# Patient Record
Sex: MALE | NOT HISPANIC OR LATINO | Employment: FULL TIME | URBAN - METROPOLITAN AREA
[De-identification: names, ages, dates, MRNs, and addresses within clinical notes are randomized per-mention and may not be internally consistent; named-entity substitution may affect disease eponyms.]

---

## 2023-03-14 LAB
CALCIDIOL (25 OH VITAMIN D3) (NG/ML) IN SER/PLAS: 54 NG/ML
COBALAMIN (VITAMIN B12) (PG/ML) IN SER/PLAS: 1125 PG/ML (ref 211–911)

## 2023-03-20 LAB — VITAMIN B6: 319.7 NMOL/L (ref 20–125)

## 2023-04-20 ASSESSMENT — ENCOUNTER SYMPTOMS
SEIZURES: 0
POLYPHAGIA: 0
TREMORS: 0
FATIGUE: 1
DIZZINESS: 0
WEIGHT LOSS: 0
BLACKOUTS: 0
VISUAL CHANGE: 0
HUNGER: 1
POLYDIPSIA: 0
HEADACHES: 1
NERVOUS/ANXIOUS: 1
WEAKNESS: 0
SPEECH DIFFICULTY: 0
CONFUSION: 1
SWEATS: 0
BLURRED VISION: 0

## 2023-04-24 ENCOUNTER — OFFICE VISIT (OUTPATIENT)
Dept: PRIMARY CARE | Facility: CLINIC | Age: 38
End: 2023-04-24
Payer: COMMERCIAL

## 2023-04-24 VITALS
HEIGHT: 70 IN | SYSTOLIC BLOOD PRESSURE: 125 MMHG | HEART RATE: 94 BPM | TEMPERATURE: 97.1 F | BODY MASS INDEX: 36.22 KG/M2 | OXYGEN SATURATION: 96 % | WEIGHT: 253 LBS | DIASTOLIC BLOOD PRESSURE: 76 MMHG

## 2023-04-24 DIAGNOSIS — F41.8 DEPRESSION WITH ANXIETY: Primary | ICD-10-CM

## 2023-04-24 DIAGNOSIS — E78.2 COMBINED HYPERLIPIDEMIA: ICD-10-CM

## 2023-04-24 DIAGNOSIS — E11.9 TYPE 2 DIABETES MELLITUS WITHOUT COMPLICATION, WITHOUT LONG-TERM CURRENT USE OF INSULIN (MULTI): ICD-10-CM

## 2023-04-24 PROBLEM — G93.32 COVID-19 LONG HAULER MANIFESTING CHRONIC FATIGUE: Status: RESOLVED | Noted: 2023-04-24 | Resolved: 2023-04-24

## 2023-04-24 PROBLEM — Z15.01 BRCA GENE MUTATION POSITIVE IN MALE: Status: RESOLVED | Noted: 2023-04-24 | Resolved: 2023-04-24

## 2023-04-24 PROBLEM — U09.9 COVID-19 LONG HAULER MANIFESTING CHRONIC FATIGUE: Status: RESOLVED | Noted: 2023-04-24 | Resolved: 2023-04-24

## 2023-04-24 PROBLEM — E11.29 TYPE 2 DIABETES MELLITUS WITH MICROALBUMINURIA (MULTI): Status: ACTIVE | Noted: 2023-04-24

## 2023-04-24 PROBLEM — E11.65 TYPE 2 DIABETES MELLITUS WITH HYPERGLYCEMIA, WITHOUT LONG-TERM CURRENT USE OF INSULIN (MULTI): Status: RESOLVED | Noted: 2023-04-24 | Resolved: 2023-04-24

## 2023-04-24 PROBLEM — D50.9 MICROCYTIC ANEMIA: Status: RESOLVED | Noted: 2023-04-24 | Resolved: 2023-04-24

## 2023-04-24 PROBLEM — F52.4 PREMATURE EJACULATION: Status: RESOLVED | Noted: 2023-04-24 | Resolved: 2023-04-24

## 2023-04-24 PROBLEM — Q76.0 SPINA BIFIDA OCCULTA: Status: RESOLVED | Noted: 2023-04-24 | Resolved: 2023-04-24

## 2023-04-24 PROBLEM — G47.33 SEVERE OBSTRUCTIVE SLEEP APNEA: Status: ACTIVE | Noted: 2023-04-24

## 2023-04-24 PROBLEM — Q63.2 ECTOPIC KIDNEY: Status: RESOLVED | Noted: 2023-04-24 | Resolved: 2023-04-24

## 2023-04-24 PROBLEM — Z15.03 BRCA GENE MUTATION POSITIVE IN MALE: Status: RESOLVED | Noted: 2023-04-24 | Resolved: 2023-04-24

## 2023-04-24 PROBLEM — R80.9 TYPE 2 DIABETES MELLITUS WITH MICROALBUMINURIA (MULTI): Status: ACTIVE | Noted: 2023-04-24

## 2023-04-24 PROBLEM — Z15.09 BRCA GENE MUTATION POSITIVE IN MALE: Status: RESOLVED | Noted: 2023-04-24 | Resolved: 2023-04-24

## 2023-04-24 LAB — HBA1C MFR BLD: 6.6 % (ref 4.2–6.5)

## 2023-04-24 PROCEDURE — 80053 COMPREHEN METABOLIC PANEL: CPT

## 2023-04-24 PROCEDURE — 3078F DIAST BP <80 MM HG: CPT | Performed by: INTERNAL MEDICINE

## 2023-04-24 PROCEDURE — 3044F HG A1C LEVEL LT 7.0%: CPT | Performed by: INTERNAL MEDICINE

## 2023-04-24 PROCEDURE — 80061 LIPID PANEL: CPT

## 2023-04-24 PROCEDURE — 83036 HEMOGLOBIN GLYCOSYLATED A1C: CPT | Performed by: INTERNAL MEDICINE

## 2023-04-24 PROCEDURE — 1036F TOBACCO NON-USER: CPT | Performed by: INTERNAL MEDICINE

## 2023-04-24 PROCEDURE — 36415 COLL VENOUS BLD VENIPUNCTURE: CPT | Performed by: INTERNAL MEDICINE

## 2023-04-24 PROCEDURE — 99215 OFFICE O/P EST HI 40 MIN: CPT | Performed by: INTERNAL MEDICINE

## 2023-04-24 PROCEDURE — 3074F SYST BP LT 130 MM HG: CPT | Performed by: INTERNAL MEDICINE

## 2023-04-24 RX ORDER — FLUTICASONE PROPIONATE 50 MCG
SPRAY, SUSPENSION (ML) NASAL
COMMUNITY
Start: 2022-10-07 | End: 2023-09-05 | Stop reason: ALTCHOICE

## 2023-04-24 RX ORDER — TIRZEPATIDE 5 MG/.5ML
5 INJECTION, SOLUTION SUBCUTANEOUS
Qty: 4 ML | Refills: 11 | Status: SHIPPED | OUTPATIENT
Start: 2023-04-24 | End: 2023-05-01 | Stop reason: ALTCHOICE

## 2023-04-24 RX ORDER — TIRZEPATIDE 5 MG/.5ML
INJECTION, SOLUTION SUBCUTANEOUS
COMMUNITY
Start: 2023-01-23 | End: 2023-04-24 | Stop reason: DRUGHIGH

## 2023-04-24 RX ORDER — ACETAMINOPHEN 500 MG
TABLET ORAL
COMMUNITY

## 2023-04-24 RX ORDER — GLUCOSAM/CHONDRO/HERB 149/HYAL 750-100 MG
1 TABLET ORAL 2 TIMES DAILY
COMMUNITY

## 2023-04-24 RX ORDER — SERTRALINE HYDROCHLORIDE 50 MG/1
150 TABLET, FILM COATED ORAL DAILY
Qty: 90 TABLET | Refills: 5 | Status: SHIPPED | OUTPATIENT
Start: 2023-04-24 | End: 2023-10-09

## 2023-04-24 RX ORDER — SERTRALINE HYDROCHLORIDE 100 MG/1
100 TABLET, FILM COATED ORAL DAILY
COMMUNITY
Start: 2021-12-30 | End: 2023-04-24 | Stop reason: ALTCHOICE

## 2023-04-24 RX ORDER — ADAPALENE 1 MG/G
1 GEL TOPICAL 2 TIMES DAILY
COMMUNITY
Start: 2019-08-01 | End: 2023-07-14 | Stop reason: ALTCHOICE

## 2023-04-24 RX ORDER — METFORMIN HYDROCHLORIDE 500 MG/1
1 TABLET, EXTENDED RELEASE ORAL EVERY 6 HOURS
COMMUNITY
Start: 2020-08-27 | End: 2023-09-05 | Stop reason: DRUGHIGH

## 2023-04-24 RX ORDER — SENNOSIDES/DOCUSATE SODIUM 8.6MG-50MG
TABLET ORAL
COMMUNITY
Start: 2020-08-28

## 2023-04-24 RX ORDER — NAPROXEN SODIUM 220 MG
220 TABLET ORAL EVERY 12 HOURS PRN
COMMUNITY

## 2023-04-24 ASSESSMENT — ENCOUNTER SYMPTOMS
SWEATS: 0
DIZZINESS: 0
FATIGUE: 1
NERVOUS/ANXIOUS: 1
VISUAL CHANGE: 0
HUNGER: 1
TREMORS: 0
BLACKOUTS: 0
BLURRED VISION: 0
POLYDIPSIA: 0
CONFUSION: 1
WEIGHT LOSS: 0
SPEECH DIFFICULTY: 0
WEAKNESS: 0
SEIZURES: 0
POLYPHAGIA: 0
HEADACHES: 1

## 2023-04-24 NOTE — PROGRESS NOTES
Subjective   Jason Claude is a 37 y.o. male who presents for Follow-up.  Diabetes  He has type 2 diabetes mellitus. No MedicAlert identification noted. The initial diagnosis of diabetes was made 2 years ago. Hypoglycemia symptoms include confusion, headaches, hunger, nervousness/anxiousness and sleepiness. Pertinent negatives for hypoglycemia include no dizziness, mood changes, pallor, seizures, speech difficulty, sweats or tremors. Associated symptoms include fatigue. Pertinent negatives for diabetes include no blurred vision, no chest pain, no foot paresthesias, no foot ulcerations, no polydipsia, no polyphagia, no polyuria, no visual change, no weakness and no weight loss. Pertinent negatives for hypoglycemia complications include no blackouts, no hospitalization, no nocturnal hypoglycemia, no required assistance and no required glucagon injection. Symptoms are stable. Pertinent negatives for diabetic complications include no CVA, heart disease, impotence, nephropathy, peripheral neuropathy, PVD or retinopathy. Risk factors for coronary artery disease include dyslipidemia, obesity, sedentary lifestyle and stress. Current diabetic treatment includes diet and oral agent (dual therapy). He is compliant with treatment all of the time. His weight is stable. He is following a diabetic and high salt diet. Meal planning includes avoidance of concentrated sweets and carbohydrate counting. He has not had a previous visit with a dietitian. He participates in exercise intermittently. He monitors blood glucose at home 3-4 x per week. He monitors urine at home <1 x per month. Blood glucose monitoring compliance is adequate. His home blood glucose trend is decreasing steadily. His breakfast blood glucose is taken between 9-10 am. His breakfast blood glucose range is generally 110-130 mg/dl. He sees a podiatrist.Eye exam is current.     PMH significant for diabetes, obesity and hypertriglyceridemia.    Rybelsus was causing a lot  "of diarrhea so switched to Mounjaro at last visit, currently on the second step, 5 mg weekly dose.  He does notice some improvement with decreased appetite, eating less at dinner but paradoxically seems to be more hungry and eating more during the day.  Metformin also with GI side effects but manages by taking it 4 times a day.  Fasting 120-140 range, checking a few days per week.    Sertraline has been helping with his mood and has also been working better than paroxetine for PE.  Recently started therapy, thinks he might benefit from a higher dose and is willing to try.  **COPIED FORWARD FOR REFERENCE**  2 children, 7-year-old Elsa and 2 year-old Kayla.. Things are going well at home, some stress with work, supervisor at [employer].    , ordained rabbi, works from home, government position, full time, history of [employer]  service with chronic pain due to back injury. Rare alcohol, no smoking.   Objective   /76   Pulse 94   Temp 36.2 °C (97.1 °F)   Ht 1.778 m (5' 10\")   Wt 115 kg (253 lb)   SpO2 96%   BMI 36.30 kg/m²    Physical Exam  Gen: NAD, pleasant, A&Ox3, central adiposity  HEENT: PERRL, EOMI, MMM, OP clear  Neck: supple, no thyromegaly, no JVD, normal carotid upstroke  Pulm: lungs CTAB, good air movement  CV: RRR, no m/r/g, 2+ DP pulses  Abd: NABS, soft, NT, ND no HSM  : no perirectal disease or abnormality  Ext: no peripheral edema, varicose veins on the thighs  Neuro: CN II-XII intact, no focal sensory or motor deficits, normal reflexes  Skin: 2 small acrochordon on right neck   Assessment/Plan   Mood disorder/premature ejaculation:   -Continue sertraline, increase from 100-150 mg, prefers to get a prescription for 50 3 times daily  -[facility]    Severe BRANDEN: on CPAP, still uncomfortable, using about 6 hours per night, improving;  still not seeing benefit w/r/t energy; f/u with sleep medicine (saw Dr. Messina 4/19/2023)    T2DM:   significant " improvement with A1c 6.6%  - Continue metformin 2000 mg daily in total  - continue Mounjaro, currently on 5mg, increase w/next fill to help with appetite control  - work on improving  diet and exercise  - recommend statin and ACE/ARB, wants to avoid    Hypertriglyceridemia: Related to insulin resistance and high serum blood glucose, recheck today      Health maintenance  -Last colonoscopy: 2 second-degree relatives colon cancer, mother had polyps, suggested early screening at age 40  -Smoking history: Never  -Counseled regarding diet and exercise  -Immunizations: annual influenza  -Followup in 4-6 months  Problem List Items Addressed This Visit    None  Visit Diagnoses       Type 2 diabetes mellitus without complication, without long-term current use of insulin (CMS/[facility])        Relevant Orders    POCT Glycosylated Hemoglobin (HGB A1C) docked device                 Reyes Reeves MD

## 2023-04-25 LAB
ALANINE AMINOTRANSFERASE (SGPT) (U/L) IN SER/PLAS: 36 U/L (ref 10–52)
ALBUMIN (G/DL) IN SER/PLAS: 4.7 G/DL (ref 3.4–5)
ALKALINE PHOSPHATASE (U/L) IN SER/PLAS: 92 U/L (ref 33–120)
ANION GAP IN SER/PLAS: 15 MMOL/L (ref 10–20)
ASPARTATE AMINOTRANSFERASE (SGOT) (U/L) IN SER/PLAS: 26 U/L (ref 9–39)
BILIRUBIN TOTAL (MG/DL) IN SER/PLAS: 0.4 MG/DL (ref 0–1.2)
CALCIUM (MG/DL) IN SER/PLAS: 9.8 MG/DL (ref 8.6–10.6)
CARBON DIOXIDE, TOTAL (MMOL/L) IN SER/PLAS: 26 MMOL/L (ref 21–32)
CHLORIDE (MMOL/L) IN SER/PLAS: 102 MMOL/L (ref 98–107)
CHOLESTEROL (MG/DL) IN SER/PLAS: 183 MG/DL (ref 0–199)
CHOLESTEROL IN HDL (MG/DL) IN SER/PLAS: 32.7 MG/DL
CHOLESTEROL/HDL RATIO: 5.6
CREATININE (MG/DL) IN SER/PLAS: 0.78 MG/DL (ref 0.5–1.3)
GFR MALE: >90 ML/MIN/1.73M2
GLUCOSE (MG/DL) IN SER/PLAS: 111 MG/DL (ref 74–99)
LDL: 84 MG/DL (ref 0–99)
NON HDL CHOLESTEROL: 150 MG/DL
POTASSIUM (MMOL/L) IN SER/PLAS: 4.4 MMOL/L (ref 3.5–5.3)
PROTEIN TOTAL: 7.5 G/DL (ref 6.4–8.2)
SODIUM (MMOL/L) IN SER/PLAS: 139 MMOL/L (ref 136–145)
TRIGLYCERIDE (MG/DL) IN SER/PLAS: 333 MG/DL (ref 0–149)
UREA NITROGEN (MG/DL) IN SER/PLAS: 13 MG/DL (ref 6–23)
VLDL: 67 MG/DL (ref 0–40)

## 2023-04-30 ENCOUNTER — PATIENT MESSAGE (OUTPATIENT)
Dept: PRIMARY CARE | Facility: CLINIC | Age: 38
End: 2023-04-30
Payer: COMMERCIAL

## 2023-04-30 DIAGNOSIS — E11.29 TYPE 2 DIABETES MELLITUS WITH MICROALBUMINURIA, WITHOUT LONG-TERM CURRENT USE OF INSULIN (MULTI): Primary | ICD-10-CM

## 2023-04-30 DIAGNOSIS — R80.9 TYPE 2 DIABETES MELLITUS WITH MICROALBUMINURIA, WITHOUT LONG-TERM CURRENT USE OF INSULIN (MULTI): Primary | ICD-10-CM

## 2023-05-01 RX ORDER — TIRZEPATIDE 7.5 MG/.5ML
7.5 INJECTION, SOLUTION SUBCUTANEOUS
Qty: 2 ML | Refills: 3 | Status: SHIPPED | OUTPATIENT
Start: 2023-05-01 | End: 2023-07-11 | Stop reason: SDUPTHER

## 2023-05-16 ENCOUNTER — APPOINTMENT (OUTPATIENT)
Dept: LAB | Facility: LAB | Age: 38
End: 2023-05-16
Payer: COMMERCIAL

## 2023-05-16 LAB
CALCIDIOL (25 OH VITAMIN D3) (NG/ML) IN SER/PLAS: 66 NG/ML
COBALAMIN (VITAMIN B12) (PG/ML) IN SER/PLAS: 1254 PG/ML (ref 211–911)

## 2023-05-22 LAB — VITAMIN B6: 286.8 NMOL/L (ref 20–125)

## 2023-07-05 RX ORDER — TIRZEPATIDE 5 MG/.5ML
5 INJECTION, SOLUTION SUBCUTANEOUS
Qty: 2 ML | Refills: 0 | Status: SHIPPED | OUTPATIENT
Start: 2023-07-05 | End: 2023-07-14 | Stop reason: ALTCHOICE

## 2023-07-11 ENCOUNTER — TELEPHONE (OUTPATIENT)
Dept: PRIMARY CARE | Facility: CLINIC | Age: 38
End: 2023-07-11
Payer: COMMERCIAL

## 2023-07-11 DIAGNOSIS — E11.29 TYPE 2 DIABETES MELLITUS WITH MICROALBUMINURIA, WITHOUT LONG-TERM CURRENT USE OF INSULIN (MULTI): Primary | ICD-10-CM

## 2023-07-11 DIAGNOSIS — R80.9 TYPE 2 DIABETES MELLITUS WITH MICROALBUMINURIA, WITHOUT LONG-TERM CURRENT USE OF INSULIN (MULTI): Primary | ICD-10-CM

## 2023-07-11 RX ORDER — TIRZEPATIDE 7.5 MG/.5ML
7.5 INJECTION, SOLUTION SUBCUTANEOUS
Qty: 2 ML | Refills: 3 | Status: SHIPPED | OUTPATIENT
Start: 2023-07-11 | End: 2023-09-05 | Stop reason: ALTCHOICE

## 2023-07-12 LAB
ALANINE AMINOTRANSFERASE (SGPT) (U/L) IN SER/PLAS: 24 U/L (ref 10–52)
ALBUMIN (G/DL) IN SER/PLAS: 4.5 G/DL (ref 3.4–5)
ALKALINE PHOSPHATASE (U/L) IN SER/PLAS: 102 U/L (ref 33–120)
ANION GAP IN SER/PLAS: 12 MMOL/L (ref 10–20)
ASPARTATE AMINOTRANSFERASE (SGOT) (U/L) IN SER/PLAS: 20 U/L (ref 9–39)
BASOPHILS (10*3/UL) IN BLOOD BY AUTOMATED COUNT: 0.03 X10E9/L (ref 0–0.1)
BASOPHILS/100 LEUKOCYTES IN BLOOD BY AUTOMATED COUNT: 0.4 % (ref 0–2)
BILIRUBIN TOTAL (MG/DL) IN SER/PLAS: 0.4 MG/DL (ref 0–1.2)
CALCIUM (MG/DL) IN SER/PLAS: 9.6 MG/DL (ref 8.6–10.6)
CARBON DIOXIDE, TOTAL (MMOL/L) IN SER/PLAS: 30 MMOL/L (ref 21–32)
CHLORIDE (MMOL/L) IN SER/PLAS: 102 MMOL/L (ref 98–107)
CREATININE (MG/DL) IN SER/PLAS: 0.92 MG/DL (ref 0.5–1.3)
EOSINOPHILS (10*3/UL) IN BLOOD BY AUTOMATED COUNT: 0.15 X10E9/L (ref 0–0.7)
EOSINOPHILS/100 LEUKOCYTES IN BLOOD BY AUTOMATED COUNT: 2.2 % (ref 0–6)
ERYTHROCYTE DISTRIBUTION WIDTH (RATIO) BY AUTOMATED COUNT: 15.7 % (ref 11.5–14.5)
ERYTHROCYTE MEAN CORPUSCULAR HEMOGLOBIN CONCENTRATION (G/DL) BY AUTOMATED: 32.6 G/DL (ref 32–36)
ERYTHROCYTE MEAN CORPUSCULAR VOLUME (FL) BY AUTOMATED COUNT: 79 FL (ref 80–100)
ERYTHROCYTES (10*6/UL) IN BLOOD BY AUTOMATED COUNT: 4.82 X10E12/L (ref 4.5–5.9)
ESTIMATED AVERAGE GLUCOSE FOR HBA1C: 111 MG/DL
GFR MALE: >90 ML/MIN/1.73M2
GLUCOSE (MG/DL) IN SER/PLAS: 159 MG/DL (ref 74–99)
HEMATOCRIT (%) IN BLOOD BY AUTOMATED COUNT: 38 % (ref 41–52)
HEMOGLOBIN (G/DL) IN BLOOD: 12.4 G/DL (ref 13.5–17.5)
HEMOGLOBIN A1C/HEMOGLOBIN TOTAL IN BLOOD: 5.5 %
IMMATURE GRANULOCYTES/100 LEUKOCYTES IN BLOOD BY AUTOMATED COUNT: 0.4 % (ref 0–0.9)
LEUKOCYTES (10*3/UL) IN BLOOD BY AUTOMATED COUNT: 6.8 X10E9/L (ref 4.4–11.3)
LYMPHOCYTES (10*3/UL) IN BLOOD BY AUTOMATED COUNT: 1.69 X10E9/L (ref 1.2–4.8)
LYMPHOCYTES/100 LEUKOCYTES IN BLOOD BY AUTOMATED COUNT: 24.9 % (ref 13–44)
MONOCYTES (10*3/UL) IN BLOOD BY AUTOMATED COUNT: 0.6 X10E9/L (ref 0.1–1)
MONOCYTES/100 LEUKOCYTES IN BLOOD BY AUTOMATED COUNT: 8.8 % (ref 2–10)
NEUTROPHILS (10*3/UL) IN BLOOD BY AUTOMATED COUNT: 4.3 X10E9/L (ref 1.2–7.7)
NEUTROPHILS/100 LEUKOCYTES IN BLOOD BY AUTOMATED COUNT: 63.3 % (ref 40–80)
NRBC (PER 100 WBCS) BY AUTOMATED COUNT: 0 /100 WBC (ref 0–0)
PLATELETS (10*3/UL) IN BLOOD AUTOMATED COUNT: 257 X10E9/L (ref 150–450)
POTASSIUM (MMOL/L) IN SER/PLAS: 4.1 MMOL/L (ref 3.5–5.3)
PROTEIN TOTAL: 6.9 G/DL (ref 6.4–8.2)
SODIUM (MMOL/L) IN SER/PLAS: 140 MMOL/L (ref 136–145)
URATE (MG/DL) IN SER/PLAS: 7.2 MG/DL (ref 4–7.5)
UREA NITROGEN (MG/DL) IN SER/PLAS: 15 MG/DL (ref 6–23)

## 2023-07-14 ENCOUNTER — OFFICE VISIT (OUTPATIENT)
Dept: PRIMARY CARE | Facility: CLINIC | Age: 38
End: 2023-07-14
Payer: COMMERCIAL

## 2023-07-14 VITALS
WEIGHT: 242 LBS | OXYGEN SATURATION: 98 % | BODY MASS INDEX: 34.72 KG/M2 | SYSTOLIC BLOOD PRESSURE: 128 MMHG | TEMPERATURE: 97.1 F | DIASTOLIC BLOOD PRESSURE: 79 MMHG | HEART RATE: 73 BPM

## 2023-07-14 DIAGNOSIS — G43.009 MIGRAINE WITHOUT AURA AND WITHOUT STATUS MIGRAINOSUS, NOT INTRACTABLE: Primary | ICD-10-CM

## 2023-07-14 PROCEDURE — 3044F HG A1C LEVEL LT 7.0%: CPT | Performed by: INTERNAL MEDICINE

## 2023-07-14 PROCEDURE — 3078F DIAST BP <80 MM HG: CPT | Performed by: INTERNAL MEDICINE

## 2023-07-14 PROCEDURE — 3074F SYST BP LT 130 MM HG: CPT | Performed by: INTERNAL MEDICINE

## 2023-07-14 PROCEDURE — 1036F TOBACCO NON-USER: CPT | Performed by: INTERNAL MEDICINE

## 2023-07-14 PROCEDURE — 99214 OFFICE O/P EST MOD 30 MIN: CPT | Performed by: INTERNAL MEDICINE

## 2023-07-14 RX ORDER — COLCHICINE 0.6 MG/1
TABLET ORAL
COMMUNITY
Start: 2023-07-12 | End: 2024-02-12 | Stop reason: WASHOUT

## 2023-07-14 RX ORDER — INDOMETHACIN 50 MG/1
CAPSULE ORAL
COMMUNITY
Start: 2023-07-12 | End: 2024-02-12 | Stop reason: WASHOUT

## 2023-07-14 RX ORDER — BUPROPION HYDROCHLORIDE 300 MG/1
1 TABLET ORAL DAILY
COMMUNITY
Start: 2023-05-23 | End: 2023-09-05 | Stop reason: ALTCHOICE

## 2023-07-14 RX ORDER — GLUC/MSM/COLGN2/HYAL/ANTIARTH3 375-375-20
TABLET ORAL
COMMUNITY

## 2023-07-14 RX ORDER — SUMATRIPTAN 50 MG/1
50 TABLET, FILM COATED ORAL ONCE AS NEEDED
Qty: 9 TABLET | Refills: 5 | Status: SHIPPED | OUTPATIENT
Start: 2023-07-14 | End: 2024-07-13

## 2023-07-14 ASSESSMENT — ENCOUNTER SYMPTOMS
NEUROLOGIC COMPLAINT: 1
NECK PAIN: 0
DIAPHORESIS: 0
ALTERED MENTAL STATUS: 0
CONFUSION: 0
AURA: 0
LIGHT-HEADEDNESS: 0
PALPITATIONS: 1
FATIGUE: 1
NEAR-SYNCOPE: 0
VISUAL CHANGE: 1
NAUSEA: 1
VOMITING: 0
HEADACHES: 1
DIZZINESS: 0
FEVER: 0
FOCAL SENSORY LOSS: 0
ABDOMINAL PAIN: 0
SHORTNESS OF BREATH: 0
MEMORY LOSS: 1
CLUMSINESS: 0
BOWEL INCONTINENCE: 0
SLURRED SPEECH: 0
WEAKNESS: 0
FOCAL WEAKNESS: 0
LOSS OF BALANCE: 0
VERTIGO: 1
BACK PAIN: 1

## 2023-07-14 NOTE — PROGRESS NOTES
"Subjective   Jason Claude is a 38 y.o. male who presents for Headache.    Interim:  - started seeing psychiatrist, changes updated in med rec    Headache   Associated symptoms include back pain, nausea and a visual change. Pertinent negatives include no abdominal pain, dizziness, fever, loss of balance, neck pain, vomiting or weakness.   Acute Neurological Problem  The patient's primary symptoms include memory loss and a visual change. The patient's pertinent negatives include no altered mental status, clumsiness, focal sensory loss, focal weakness, loss of balance, near-syncope, slurred speech, syncope or weakness. This is a chronic problem. The current episode started more than 1 year ago. The neurological problem developed gradually. The problem has been waxing and waning since onset. There was left-sided and right-sided focality noted. Associated symptoms include back pain, fatigue, headaches, nausea, palpitations and vertigo. Pertinent negatives include no abdominal pain, auditory change, aura, bladder incontinence, bowel incontinence, chest pain, confusion, diaphoresis, dizziness, fever, light-headedness, neck pain, shortness of breath or vomiting. Past treatments include acetaminophen, aspirin, bed rest, drinking, position change and sleep. The treatment provided mild relief.     From messaging:  \" ...along with my anxiety issues, I have consistently gotten headaches accompanying the more severe anxiety attacks (3-4 times a month). My psychiatrist added Wellbutrin to the sertraline, and that has definitely helped the anxiety, such that the general anxiety has gotten much better and the more severe attacks are only 1-2 times a month. Prior to starting the Wellbutrin, regular OTC pain relievers helped manage the headaches. Now, however, they no longer seem to be working. Is there anything stronger (but still safe given my other meds) I can take \"as needed\"? .........................I have tried Excedrin, " "Tylenol, Advil, Aleve, Genexa, Motrin, and [employer]'s generic aspirin. None of them are helping with the headaches.   They generally start above one eye (but not both at the same time) and move up. It used to be more like tension headaches, but now I think that the ones I'm getting are migraines. I'm not getting the (formerly) more frequent mild headaches anymore, but the ones I'm getting now are the most intense, throbbing pain with sensitivity to light and sound, and sometimes, but not always, vertigo/dizziness. The problem with these really bad ones is they are debilitating. I have to go somewhere with no light or sound, and I'll frequently just lay down and try to sleep it off since it can last for hours....\"    Chronic headaches, tied to anxiety.  Anxiety is better, less frequent anxiety attacks.  Less frequent HA's but more severe and OTC which he previously took no longer helping.  Despite not working, continued taking as needed, last about 2 weeks ago tried Excedrin migraine.  Takes Aleve for back pain intermittently.  Previously central, now feeling above the eye and associated with nausea and light-sensitivity, noise sensitivity.  Pain stays focused on one side but has occurred on both sides.  Starts as a throbbing and as it moves up more pressure.  Occurring about 2-3 times per month.  No history of migraine headaches.  Previously frequent and frontal.  No vision changes or visual changes.  Not positional.    Has been having a lot of stress/emotional triggers.  Currently having onset of headache.    Also currently being treated for gout, colchicine 0.6mg and indomethacin 50mg.    Objective   /79   Pulse 73   Temp 36.2 °C (97.1 °F)   Wt 110 kg (242 lb)   SpO2 98%   BMI 34.72 kg/m²    Physical Exam  Mild discomfort, aaox3  Perrla,eomi, no papilledema  CN ii-xii intact and symmetric  5/5 strength x4, normal reflexes SILT  No carotid bruit  RRR  Assessment/Plan     Headaches: appear to be migrainous, " fairly classic symptoms without aura; triggered by emotional stress; no red flags  - trial of Imitrex  - consider prophylactic therapy and/or neuro referral based on response and frequency  Problem List Items Addressed This Visit    None           Reyes Reeves MD

## 2023-07-15 NOTE — TELEPHONE ENCOUNTER
Rx Refill Request Telephone Encounter    Name:  Jason Claude  :  [id number]  Medication Name:  Mounjaro

## 2023-09-01 ASSESSMENT — PROMIS GLOBAL HEALTH SCALE
RATE_MENTAL_HEALTH: FAIR
CARRYOUT_PHYSICAL_ACTIVITIES: MODERATELY
CARRYOUT_SOCIAL_ACTIVITIES: GOOD
RATE_PHYSICAL_HEALTH: FAIR
RATE_AVERAGE_PAIN: 4
RATE_SOCIAL_SATISFACTION: GOOD
RATE_GENERAL_HEALTH: FAIR
RATE_AVERAGE_FATIGUE: SEVERE
EMOTIONAL_PROBLEMS: OFTEN
RATE_QUALITY_OF_LIFE: FAIR

## 2023-09-05 ENCOUNTER — OFFICE VISIT (OUTPATIENT)
Dept: PRIMARY CARE | Facility: CLINIC | Age: 38
End: 2023-09-05
Payer: COMMERCIAL

## 2023-09-05 ENCOUNTER — LAB (OUTPATIENT)
Dept: LAB | Facility: LAB | Age: 38
End: 2023-09-05
Payer: COMMERCIAL

## 2023-09-05 VITALS
HEART RATE: 79 BPM | DIASTOLIC BLOOD PRESSURE: 72 MMHG | HEIGHT: 70 IN | TEMPERATURE: 97.1 F | OXYGEN SATURATION: 98 % | SYSTOLIC BLOOD PRESSURE: 117 MMHG | WEIGHT: 243 LBS | BODY MASS INDEX: 34.79 KG/M2

## 2023-09-05 DIAGNOSIS — D50.9 MICROCYTIC ANEMIA: ICD-10-CM

## 2023-09-05 DIAGNOSIS — R80.9 TYPE 2 DIABETES MELLITUS WITH MICROALBUMINURIA, WITHOUT LONG-TERM CURRENT USE OF INSULIN (MULTI): ICD-10-CM

## 2023-09-05 DIAGNOSIS — E78.2 COMBINED HYPERLIPIDEMIA: ICD-10-CM

## 2023-09-05 DIAGNOSIS — G93.32 COVID-19 LONG HAULER MANIFESTING CHRONIC FATIGUE: Primary | ICD-10-CM

## 2023-09-05 DIAGNOSIS — E11.29 TYPE 2 DIABETES MELLITUS WITH MICROALBUMINURIA, WITHOUT LONG-TERM CURRENT USE OF INSULIN (MULTI): ICD-10-CM

## 2023-09-05 DIAGNOSIS — U09.9 COVID-19 LONG HAULER MANIFESTING CHRONIC FATIGUE: Primary | ICD-10-CM

## 2023-09-05 DIAGNOSIS — E66.09 CLASS 1 OBESITY DUE TO EXCESS CALORIES WITH SERIOUS COMORBIDITY AND BODY MASS INDEX (BMI) OF 34.0 TO 34.9 IN ADULT: ICD-10-CM

## 2023-09-05 PROBLEM — G43.909 MIGRAINES: Status: ACTIVE | Noted: 2021-12-19

## 2023-09-05 PROBLEM — E66.811 CLASS 1 OBESITY DUE TO EXCESS CALORIES WITH SERIOUS COMORBIDITY AND BODY MASS INDEX (BMI) OF 34.0 TO 34.9 IN ADULT: Status: ACTIVE | Noted: 2023-09-05

## 2023-09-05 LAB
BASOPHILS (10*3/UL) IN BLOOD BY AUTOMATED COUNT: 0.04 X10E9/L (ref 0–0.1)
BASOPHILS/100 LEUKOCYTES IN BLOOD BY AUTOMATED COUNT: 0.7 % (ref 0–2)
CALCIDIOL (25 OH VITAMIN D3) (NG/ML) IN SER/PLAS: 70 NG/ML
CHOLESTEROL (MG/DL) IN SER/PLAS: 197 MG/DL (ref 0–199)
CHOLESTEROL IN HDL (MG/DL) IN SER/PLAS: 34 MG/DL
CHOLESTEROL/HDL RATIO: 5.8
COBALAMIN (VITAMIN B12) (PG/ML) IN SER/PLAS: 1250 PG/ML (ref 211–911)
EOSINOPHILS (10*3/UL) IN BLOOD BY AUTOMATED COUNT: 0.09 X10E9/L (ref 0–0.7)
EOSINOPHILS/100 LEUKOCYTES IN BLOOD BY AUTOMATED COUNT: 1.6 % (ref 0–6)
ERYTHROCYTE DISTRIBUTION WIDTH (RATIO) BY AUTOMATED COUNT: 14.9 % (ref 11.5–14.5)
ERYTHROCYTE MEAN CORPUSCULAR HEMOGLOBIN CONCENTRATION (G/DL) BY AUTOMATED: 31.9 G/DL (ref 32–36)
ERYTHROCYTE MEAN CORPUSCULAR VOLUME (FL) BY AUTOMATED COUNT: 83 FL (ref 80–100)
ERYTHROCYTES (10*6/UL) IN BLOOD BY AUTOMATED COUNT: 5.2 X10E12/L (ref 4.5–5.9)
FERRITIN (UG/LL) IN SER/PLAS: 48 UG/L (ref 20–300)
HEMATOCRIT (%) IN BLOOD BY AUTOMATED COUNT: 42.9 % (ref 41–52)
HEMOGLOBIN (G/DL) IN BLOOD: 13.7 G/DL (ref 13.5–17.5)
HEMOGLOBIN (PG) IN RETICULOCYTES: 31 PG (ref 28–38)
IMMATURE GRANULOCYTES/100 LEUKOCYTES IN BLOOD BY AUTOMATED COUNT: 0.5 % (ref 0–0.9)
IMMATURE RETIC FRACTION: 15.9 % (ref 0–16)
IRON (UG/DL) IN SER/PLAS: 63 UG/DL (ref 35–150)
IRON BINDING CAPACITY (UG/DL) IN SER/PLAS: 433 UG/DL (ref 240–445)
IRON SATURATION (%) IN SER/PLAS: 15 % (ref 25–45)
LDL: 120 MG/DL (ref 0–99)
LEUKOCYTES (10*3/UL) IN BLOOD BY AUTOMATED COUNT: 5.8 X10E9/L (ref 4.4–11.3)
LYMPHOCYTES (10*3/UL) IN BLOOD BY AUTOMATED COUNT: 1.72 X10E9/L (ref 1.2–4.8)
LYMPHOCYTES/100 LEUKOCYTES IN BLOOD BY AUTOMATED COUNT: 29.7 % (ref 13–44)
MONOCYTES (10*3/UL) IN BLOOD BY AUTOMATED COUNT: 0.45 X10E9/L (ref 0.1–1)
MONOCYTES/100 LEUKOCYTES IN BLOOD BY AUTOMATED COUNT: 7.8 % (ref 2–10)
NEUTROPHILS (10*3/UL) IN BLOOD BY AUTOMATED COUNT: 3.47 X10E9/L (ref 1.2–7.7)
NEUTROPHILS/100 LEUKOCYTES IN BLOOD BY AUTOMATED COUNT: 59.7 % (ref 40–80)
NON HDL CHOLESTEROL: 163 MG/DL
NRBC (PER 100 WBCS) BY AUTOMATED COUNT: 0 /100 WBC (ref 0–0)
PLATELETS (10*3/UL) IN BLOOD AUTOMATED COUNT: 268 X10E9/L (ref 150–450)
RETICULOCYTES (10*3/UL) IN BLOOD: 0.1 X10E12/L (ref 0.02–0.12)
RETICULOCYTES/100 ERYTHROCYTES IN BLOOD BY AUTOMATED COUNT: 1.8 % (ref 0.5–2)
TRIGLYCERIDE (MG/DL) IN SER/PLAS: 214 MG/DL (ref 0–149)
VLDL: 43 MG/DL (ref 0–40)

## 2023-09-05 PROCEDURE — 82306 VITAMIN D 25 HYDROXY: CPT

## 2023-09-05 PROCEDURE — 3074F SYST BP LT 130 MM HG: CPT | Performed by: INTERNAL MEDICINE

## 2023-09-05 PROCEDURE — 82728 ASSAY OF FERRITIN: CPT

## 2023-09-05 PROCEDURE — 3078F DIAST BP <80 MM HG: CPT | Performed by: INTERNAL MEDICINE

## 2023-09-05 PROCEDURE — 36415 COLL VENOUS BLD VENIPUNCTURE: CPT

## 2023-09-05 PROCEDURE — 83540 ASSAY OF IRON: CPT

## 2023-09-05 PROCEDURE — 3008F BODY MASS INDEX DOCD: CPT | Performed by: INTERNAL MEDICINE

## 2023-09-05 PROCEDURE — 3044F HG A1C LEVEL LT 7.0%: CPT | Performed by: INTERNAL MEDICINE

## 2023-09-05 PROCEDURE — 99395 PREV VISIT EST AGE 18-39: CPT | Performed by: INTERNAL MEDICINE

## 2023-09-05 PROCEDURE — 1036F TOBACCO NON-USER: CPT | Performed by: INTERNAL MEDICINE

## 2023-09-05 PROCEDURE — 84207 ASSAY OF VITAMIN B-6: CPT

## 2023-09-05 PROCEDURE — 99214 OFFICE O/P EST MOD 30 MIN: CPT | Performed by: INTERNAL MEDICINE

## 2023-09-05 PROCEDURE — 82607 VITAMIN B-12: CPT

## 2023-09-05 PROCEDURE — 85045 AUTOMATED RETICULOCYTE COUNT: CPT

## 2023-09-05 PROCEDURE — 83550 IRON BINDING TEST: CPT

## 2023-09-05 PROCEDURE — 80061 LIPID PANEL: CPT

## 2023-09-05 PROCEDURE — 85025 COMPLETE CBC W/AUTO DIFF WBC: CPT

## 2023-09-05 RX ORDER — METFORMIN HYDROCHLORIDE 500 MG/1
500 TABLET, EXTENDED RELEASE ORAL
Qty: 180 TABLET | Refills: 3 | Status: SHIPPED | OUTPATIENT
Start: 2023-09-05 | End: 2023-12-22

## 2023-09-05 RX ORDER — TIRZEPATIDE 10 MG/.5ML
10 INJECTION, SOLUTION SUBCUTANEOUS
Qty: 2 ML | Refills: 11 | Status: SHIPPED | OUTPATIENT
Start: 2023-09-05 | End: 2024-03-18 | Stop reason: RX

## 2023-09-05 RX ORDER — SOLRIAMFETOL 75 MG/1
1 TABLET, FILM COATED ORAL DAILY
COMMUNITY
Start: 2023-07-19 | End: 2024-02-12 | Stop reason: WASHOUT

## 2023-09-05 NOTE — PROGRESS NOTES
"Subjective   Jason Claude is a 38 y.o. male who presents for Annual Exam.    PMH significant for migraines, anxiety/depression, diabetes, obesity and hypertriglyceridemia.    See visit from 7/14/2023 regarding headaches, thought to be migraine without aura.  Prescribed Imitrex.  Has had less frequency, only two since last time, triggered by stress and anxiety.  Took Imitrex, possibly less severe but unsure.    Mounjaro is helping with appetite, thinks it could help more as he does get some hunger late in the evening.  Currently on 7.5mg per week.  Has lost 12lbs since starting.  Metformin also with GI side effects at 2000mg per day, sugars were well-controlled so decreased to 500mg BID.  Fasting  range, checking a few days per week.        2 children, 8-year-old Elsa and 3 year-old Kayla. Things are going well at home, some stress with work, supervisor at [employer].    , ordained rabbi, works from home, government position, full time, history of [employer]  service with chronic pain due to back injury. Rare alcohol, no smoking.   Objective   /72   Pulse 79   Temp 36.2 °C (97.1 °F)   Ht 1.778 m (5' 10\")   Wt 110 kg (243 lb)   SpO2 98%   BMI 34.87 kg/m²      Physical Exam  Gen: NAD, pleasant, A&Ox3, central adiposity  HEENT: PERRL, EOMI, MMM, OP clear  Neck: supple, no thyromegaly, no JVD, normal carotid upstroke  Pulm: lungs CTAB, good air movement  CV: RRR, no m/r/g, 2+ DP pulses  Abd: NABS, soft, NT, ND no HSM  : no perirectal disease or abnormality  Ext: no peripheral edema, varicose veins on the thighs  Neuro: CN II-XII intact, no focal sensory or motor deficits, normal reflexes    Assessment/Plan   Mood disorder/premature ejaculation:   - treated by psychiatry, doing okay on current  -Continue sertraline 150 mg  -[facility]    Severe BRANDEN: on CPAP, has gotten more accustomed to it but has not noticed any benefit in terms of fatigue; however " sleep quality on his downloads shows significant improvement in events; using at least 6 hours per night, continue; f/u with sleep medicine  (Dr. Messina), started on Sunosi    T2DM:   significant improvement with A1c 5.5%  - Continue metformin 1000mg daily in total  - continue Mounjaro, currently on 7.5mg, increase w/next fill to help with appetite control to 10mg  - work on improving  diet and exercise  - recommend statin and ACE/ARB, wants to avoid    Hypertriglyceridemia: Related to insulin resistance and high serum blood glucose, rechecked in 4/24/2023    Migraines: without aura, continue prn treatment for now    Anemia: hx of blood donor but has been a long time; check iron levels today, check FIT/fOBT due to family history.      Health maintenance  -Last colonoscopy: 2 second-degree relatives colon cancer, mother had polyps, suggested early screening at age 40  -Smoking history: Never  -Counseled regarding diet and exercise  -Immunizations: annual influenza  -Followup in 4-6 months  Problem List Items Addressed This Visit    None         Reyes Reeves MD

## 2023-09-09 LAB — VITAMIN B6: 293.7 NMOL/L (ref 20–125)

## 2023-09-17 PROBLEM — K59.09 INTERMITTENT CONSTIPATION: Status: ACTIVE | Noted: 2023-09-17

## 2023-09-17 PROBLEM — R53.83 FATIGUE: Status: ACTIVE | Noted: 2023-09-17

## 2023-09-17 PROBLEM — R41.89 COGNITIVE CHANGES: Status: ACTIVE | Noted: 2023-09-17

## 2023-09-17 PROBLEM — E88.819 INSULIN RESISTANCE: Status: ACTIVE | Noted: 2023-09-17

## 2023-09-17 PROBLEM — B35.1 ONYCHOMYCOSIS: Status: ACTIVE | Noted: 2023-09-17

## 2023-09-17 PROBLEM — R45.4 IRRITABILITY AND ANGER: Status: ACTIVE | Noted: 2023-09-17

## 2023-09-17 PROBLEM — R80.9 DIABETES MELLITUS WITH MICROALBUMINURIA (MULTI): Status: ACTIVE | Noted: 2023-09-17

## 2023-09-17 PROBLEM — G47.10 HYPERSOMNIA: Status: ACTIVE | Noted: 2023-09-17

## 2023-09-17 PROBLEM — E11.29 DIABETES MELLITUS WITH MICROALBUMINURIA (MULTI): Status: ACTIVE | Noted: 2023-09-17

## 2023-09-17 PROBLEM — F34.1 PERSISTENT DEPRESSIVE DISORDER WITH ANXIOUS DISTRESS, CURRENTLY SEVERE: Status: ACTIVE | Noted: 2023-09-17

## 2023-09-17 PROBLEM — M21.40 PES PLANUS: Status: ACTIVE | Noted: 2023-09-17

## 2023-09-17 PROBLEM — M47.819 ARTHRITIS OF SPINE: Status: ACTIVE | Noted: 2023-09-17

## 2023-09-17 PROBLEM — F43.22 PERSISTENT ADJUSTMENT DISORDER WITH ANXIETY: Status: ACTIVE | Noted: 2023-09-17

## 2023-09-17 RX ORDER — BUPROPION HYDROCHLORIDE 150 MG/1
150 TABLET ORAL DAILY
COMMUNITY
Start: 2023-07-21 | End: 2023-10-15

## 2023-10-09 DIAGNOSIS — F41.8 DEPRESSION WITH ANXIETY: ICD-10-CM

## 2023-10-09 RX ORDER — SERTRALINE HYDROCHLORIDE 50 MG/1
150 TABLET, FILM COATED ORAL DAILY
Qty: 90 TABLET | Refills: 5 | Status: SHIPPED | OUTPATIENT
Start: 2023-10-09 | End: 2023-10-18 | Stop reason: SDUPTHER

## 2023-10-15 RX ORDER — DOXYCYCLINE HYCLATE 100 MG
TABLET ORAL
COMMUNITY
Start: 2023-09-20 | End: 2023-11-27 | Stop reason: WASHOUT

## 2023-10-18 ENCOUNTER — TELEMEDICINE (OUTPATIENT)
Dept: BEHAVIORAL HEALTH | Facility: CLINIC | Age: 38
End: 2023-10-18
Payer: COMMERCIAL

## 2023-10-18 ENCOUNTER — APPOINTMENT (OUTPATIENT)
Dept: SLEEP MEDICINE | Facility: CLINIC | Age: 38
End: 2023-10-18
Payer: COMMERCIAL

## 2023-10-18 DIAGNOSIS — F34.1 PERSISTENT DEPRESSIVE DISORDER WITH ANXIOUS DISTRESS, CURRENTLY SEVERE: Primary | ICD-10-CM

## 2023-10-18 PROCEDURE — 99215 OFFICE O/P EST HI 40 MIN: CPT | Performed by: STUDENT IN AN ORGANIZED HEALTH CARE EDUCATION/TRAINING PROGRAM

## 2023-10-18 PROCEDURE — 99417 PROLNG OP E/M EACH 15 MIN: CPT | Performed by: STUDENT IN AN ORGANIZED HEALTH CARE EDUCATION/TRAINING PROGRAM

## 2023-10-18 RX ORDER — SERTRALINE HYDROCHLORIDE 50 MG/1
150 TABLET, FILM COATED ORAL DAILY
Qty: 90 TABLET | Refills: 5 | Status: SHIPPED | OUTPATIENT
Start: 2023-10-18 | End: 2024-01-24 | Stop reason: SDUPTHER

## 2023-10-18 NOTE — ASSESSMENT & PLAN NOTE
Caused by mental habit of suppressing personal needs, leading to frustration with feeling trapped in undesired outcomes, as well as intermittent explosive anger. Addiction-like behavior leads to brief pleasure followed by worsening depression.  - cont sertraline  - refer to therapy

## 2023-10-18 NOTE — PATIENT INSTRUCTIONS
- stand by for therapist recommendation  - continue sertraline    Rebecca Corbett interview on craving and the brain's pain-pleasure balance:  [email or web address]  [email or web address]

## 2023-10-18 NOTE — PROGRESS NOTES
Outpatient Psychiatry Follow-Up    Subjective   Jason Claude is a 38 y.o. male here for follow up.    Interim History  Forgetting to do positivity practices.   Tried to do I statements but not always so well-received. Even when not waiting until he's angry, his wife acknowledges it but doesn't change. This didn't make him feel better because he had an expectation of her reacting.   He thinks his outbursts are a little better; he does remember yelling it his kids a couple of times.  He's not sure about his mood in general compared to 6 months ago, maybe a little better. It ebbs and flows. He'll have a couple of good days and then weeks of bad.   He's trying to be less stressed out about work, but knows a lot is expected. He paces himself more, takes breaks, etc. Works more slowly.   Having some major mental issues because of physical relationship. He's having intrusive thoughts about things he wouldn't do. Texting with someone, will feel better for a couple of days and this improves his relationship with his wife also. But then he feels terrible again, has negative self-thoughts.       Objective   Mental Status Exam:  General Appearance: Well groomed, appropriate eye contact  Attitude/Behavior: Cooperative  Motor: No psychomotor agitation or retardation, no tremor or other abnormal movements  Speech: Normal rate, volume, prosody  Mood: still depressed  Affect: Euthymic, full-range  Thought Process: Linear, goal directed  Thought Associations: No loosening of associations  Thought Content: Normal  Perception: No perceptual abnormalities noted  Sensorium: Alert  Insight: Intact  Judgement: Intact  Cognition: Cognitively intact to conversational testing with respect to attention, orientation, fund of knowledge, recent and remote memory, and language    Lab Review:   not applicable    Assessment/Plan     Problem List Items Addressed This Visit             ICD-10-CM    Persistent depressive disorder with anxious distress,  currently moderate - Primary F34.1     Caused by mental habit of suppressing personal needs, leading to frustration with feeling trapped in undesired outcomes, as well as intermittent explosive anger. Addiction-like behavior leads to brief pleasure followed by worsening depression.  - cont sertraline  - refer to therapy         Relevant Medications    sertraline (Zoloft) 50 mg tablet       Suicide Risk Assessment  The patient has few risk factors for suicide and does not endorse worsening depression; they are therefore at low risk for suicide and appropriate for continued outpatient follow-up.    I provided the mobile crisis number and encouraged calling this, [phone] or 911 in case of a mental health crisis, including feeling suicidal or losing touch with reality.

## 2023-11-21 PROBLEM — U09.9 POST-ACUTE SEQUELAE OF COVID-19 (PASC): Status: ACTIVE | Noted: 2023-11-21

## 2023-11-21 NOTE — ASSESSMENT & PLAN NOTE
Current Assessment and Plan:  11/2023: fatigue, cognitive changes, anxiety and depression  -stop Vitamin B6 supplementation, we will recheck your level at time of follow-up  -continue close follow-up with your specialists and their recommendations  -referral to Occupational Therapist Radhika Morales, let me know if you have any difficulty scheduling this  -extending work accommodations through 2/29    Previous Assessment and Plan:     08/2023:  fatigue, cognitive changes, anxiety and depression  -recheck Vitamin B6, B12, and D levels  -continue close follow-up with your specialists and their recommendations  -continue to wear CPAP  -> stop B6    05/2023  fatigue, cognitive changes, anxiety and depression  -recheck Vitamin B6, B12, and D levels  -referral Psychiatry Dr. Maynard  -referral to [facility]  -follow-up with Neuropsychology Rehab and Sleep Medicine as scheduled  -continue to wear CPAP  Stop B6, decrease B12, D once daily    02/2023: fatigue, cognitive changes, sinus congestion, anxiety and depression  -restart nose sprays for sinus congestion. Take Flonase for 1-2 weeks and if not having sufficient improvement add Azelastine  -repeat check Vitamins B6, B12, D   -wear CPAP during naps as well  -if you do not feel improved when back on Sertraline, I recommend evaluation by Psychiatry  -[facility], please let me know if you need a referral to our immediately available IOP option   -if no improvement in cognitive function and fatigue level in 1-2 months I recommend we refer you to Neuropsychology Rehabilitation  .brain  Stop B6, decrease B12, D 2K daily    11/2022: Fatigue, cognitive changes, sinus congestion, intermittent constipation and diarrhea, mood changes  -follow-up with blood work orders, please have this drawn in the morning and fasting  .juanito  -follow-up with therapist to address mood changes as scheduled  .fog  -try Azelastine  nose spray to help with congestion  -to schedule with [facility] call [phone]  .ibs  .brain  Stop B1 and B6, decrease B12, ? D supplement dose    08/2022:  Fatigue, cognitive changes, sinus congestion, intermittent constipation, mood changes  .blood  .hsat  .fog  .con  -for intermittent constipation, try taking Miralax or a Magnesium supplement with your iron supplement. Magnesium Glycinate causes less GI side effects, Magnesium Oxide and Citrate can help with GI motility  -Flonase  .brain

## 2023-11-21 NOTE — PROGRESS NOTES
MOSHE Virtual The virtual visit conducted with Audio and Video.    Verbal consent was given for the following virtual visit, patient is currently located in [place]. All issues discussed and addressed below were done so without a physical examination. If it was felt the patient needed be seen in clinic in person they were directed there.     Subjective   COVID-19 Infection Date:  12/21/2021 (tested positive still on 1/7/22) (sx: cough, fatigue, headache, runny nose, confusion, brain fog - no treatment or hospitalization)    COVID-19 vaccine status: Pfizer 2/17/21, 3/8/21, 10/25/21    Occupation: full-time supervisor at [employer]     Current Providers: PCP Dr. Reyes Reeves, Sleep Medicine Dr. Messina, Psychiatry Dr. Maynard, Neuropsychology Dr. Arreola, Psychology Beatriz Dumont    Survey scores: 08/2022 -> 01/2023 -> 07/2023  PHQ-9: 17 -> 21 -> 13  MATHIEU-7: 10 -> 21 -> 11  Sleep Wellness: 11 snores -> 14 -> 13  FSS average: 6.444 -> 5.889 -> 6  Modified ECog average: 2.25 -> 2.833 -> 1.667  MOCA: 19/22 blind version (08/2022) -> 20/22 (08/2023)  Overall Health: 55 -> 66 -> 55 -> 51 -> 50    38 y.o. male with h/o COVID-19 in December 2021, BRCA gene mutation, obesity, GERD, DM2, presents for follow-up at the  [facility] with c/o fatigue, cognitive changes, anxiety and depression.    Doing pretty much the same  Fatigue comes and goes, still around the same, yesterday spend most of the day in bed  Still taking sunosi, upcoming sleep medicine appointment, will ask him to up the dosage to see if that helps  Using CPAP consistently, didn't use the CPAP the other night accidentally and at that point felt as though didn't sleep at all  Cognitive changes are around the same, maybe a little bit better if at all  Anxiety and depression are around the same, no significant improvement yet  Psychiatry referred to Psychology, has not scheduled that yet  Work has improved some, most of his team has been  established  Would like to restart neuropsychology rehab  Notes that caffeine helps a lot, on the weekend having one coffee by noon and falling asleep in his chair  Reduced  Will need letter for accomodation renewed, has been going into the office one day every other week but the day after he can barely function    Relevant prior healthcare visits:  -04/2023 Sleep Medicine recommends increasing time on CPAP, if no improvement in fatigue consider stimulant, weight loss recommended  -04/2023 PCP increased sertraline dose, mounjaro and metformin for DM2 and weight loss  -07/2023 Neuropsychology Rehab most recent session  -07/2023 PCP notes headaches appear to be migraines, prescribed trial of Imitrex  -07/2023 Sleep Medicine prescribed sunosi for residual sleepiness in setting of BRANDEN  -08/2023 Psychology session #6  -10/2023 Psychiatry for depression with anxious distress, caused by mental habit of suppressing personal needs that is leading to frustration with feeling trapped in undesired outcomes, as well as intermittent explosive anger. Addiction-like behavior leads to brief pleasure followed by worsening depression. Continued sertraline and referred to therapy    Relevant prior diagnostic studies:  -08/2022 HSAT shows severe BRANDEN with O2 cole 78.1%    Relevant prior laboratory values, unremarkable unless noted:  -07/2022 CBC/D, Vitamin B12 1010, TSH, CMP   -10/2022 HbA1c 7.8%  -11/2022 Vitamin B1 245, Vitamin B6 374, Vitamin B2, SPEP, Tryptase, Respiratory Allergy Profile, CYNTHIA positive, QUINTON normal, Citrulline Ab, BNP, HBA1c 7.6%, ESR 33, RF, Lipids, Mag, Iron studies, Ferritin, CMP, CRP, CK, CBC/D, TSH, Vitamin B12 1057, Folate, Vitamin D 30, AM cortisol, Tropinin, D-Dimer, Coags  -03/2023 Vitamin B6 319, Vitamin D 54, Vitamin B12 1125  -04/2023 CMP, Lipids elevated  -05/2023 Vitamin B6 286, Vitamin D 66, Vitamin B12 1254  -07/2023 HbA1c, CBC/D with Hb 12.4, CMP, Uric Acid  -09/2023 Vitamin B6 293, Lipids, Vitamin  D, Vitamin B12 1250, Retics, iron studies, ferritin, CBC/D    Exercise routine: none  Diet:  Weight hx: pre-COVID-19 250 lbs -> post-COVID-19 265 lbs -> 250lbs -> 255lbs -> 255lbs -> 252lbs  Social: , two girls      Current Outpatient Medications:     acetaminophen (Tylenol) 500 mg tablet, Take by mouth., Disp: , Rfl:     BACILLUS COAGULANS-INULIN ORAL, Take by mouth., Disp: , Rfl:     blood sugar diagnostic (Advanced Gluc Meter Test Strip) strip, Use to test two times daily, Disp: , Rfl:     colchicine 0.6 mg tablet, , Disp: , Rfl:     doxycycline (Vibra-Tabs) 100 mg tablet, Take 1 tablet by mouth twice daily. Take with food and water and sit upright for 30 minutes afterwards, Disp: , Rfl:     ferrous gluconate 236 mg (27 mg iron) tablet, Take by mouth., Disp: , Rfl:     indomethacin (Indocin) 50 mg capsule, , Disp: , Rfl:     metFORMIN  mg 24 hr tablet, Take 1 tablet (500 mg) by mouth 2 times a day with meals., Disp: 180 tablet, Rfl: 3    multivitamin capsule, Take by mouth., Disp: , Rfl:     naproxen sodium (Aleve) 220 mg tablet, Take 1 tablet (220 mg) by mouth every 12 hours if needed for mild pain (1 - 3)., Disp: , Rfl:     omega 3-dha-epa-fish oil (Fish OiL) 1,000 mg (120 mg-180 mg) capsule, Take 1 capsule (1,000 mg) by mouth in the morning and 1 capsule (1,000 mg) before bedtime., Disp: , Rfl:     sertraline (Zoloft) 50 mg tablet, Take 3 tablets (150 mg) by mouth once daily., Disp: 90 tablet, Rfl: 5    SUMAtriptan (Imitrex) 50 mg tablet, Take 1 tablet (50 mg) by mouth 1 time if needed for migraine. May repeat after 2 hours. Max dose is 200mg/24 hours, Disp: 9 tablet, Rfl: 5    Sunosi 75 mg tablet, Take 1 tablet by mouth once daily., Disp: , Rfl:     tirzepatide (Mounjaro) 10 mg/0.5 mL pen injector, Inject 10 mg under the skin 1 (one) time per week., Disp: 2 mL, Rfl: 11    Past Medical History:   Diagnosis Date    Abnormal findings on diagnostic imaging of other specified body structures      Abnormal finding on chest xray    Anesthesia of skin 05/29/2018    Left facial numbness    BRCA gene mutation positive in male 04/24/2023    had genetics consultation, no other screenings recommended    Chronic back pain 10/16/2013    COVID-19 long hauler manifesting chronic fatigue 04/24/2023    Ectopic kidney 04/24/2023    Microcytic anemia 04/24/2023    Personal history of diseases of the blood and blood-forming organs and certain disorders involving the immune mechanism     History of anemia    Personal history of other (healed) physical injury and trauma 08/04/2015    History of back injury    Personal history of other diseases of the digestive system     History of gastroesophageal reflux (GERD)    Personal history of other specified conditions     History of heartburn    Plantar wart of both feet     History of onychomycosis and recurrent wart on his foot, has gone to a dermatologist received bleomycin injections as a last resort and still with recurrence. Currently under treatment for both issues with podiatrist Richie Paulino.    Premature ejaculation 04/24/2023    Spina bifida occulta 04/24/2023    Type 2 diabetes mellitus with hyperglycemia, without long-term current use of insulin (CMS/[facility]) 04/24/2023       Past Surgical History:   Procedure Laterality Date    REFRACTIVE SURGERY  08/04/2015    Corneal LASIK       Family History   Problem Relation Name Age of Onset    Diabetes Mother      Colon polyps Mother      Obesity Mother      Obesity Father      Breast cancer Mother's Sister      Colon cancer Mother's Brother  60    Diabetes Mother's Brother      Colon cancer Maternal Grandmother  60    Myasthenia gravis Maternal Grandmother      Pancreatic cancer Maternal Grandfather      Anesthesia problems Paternal Grandfather      Deep vein thrombosis Paternal Grandfather         Objective   There were no vitals taken for this visit.    Physical Exam  Constitutional:       Comments: no acute distress,  alert/conversational, appropriate affect, no focal neurological deficits noted via video appointment          Assessment/Plan   Problem List Items Addressed This Visit             ICD-10-CM       High    Post-acute sequelae of COVID-19 (PASC) - Primary U09.9     Current Assessment and Plan:      Previous Assessment and Plan:     08/2023:  fatigue, cognitive changes, anxiety and depression  -recheck Vitamin B6, B12, and D levels  -continue close follow-up with your specialists and their recommendations  -continue to wear CPAP    05/2023  fatigue, cognitive changes, anxiety and depression  -recheck Vitamin B6, B12, and D levels  -referral Psychiatry Dr. Maynard  -referral to [facility]  -follow-up with Neuropsychology Rehab and Sleep Medicine as scheduled  -continue to wear CPAP  Stop B6, decrease B12, D once daily    02/2023: fatigue, cognitive changes, sinus congestion, anxiety and depression  -restart nose sprays for sinus congestion. Take Flonase for 1-2 weeks and if not having sufficient improvement add Azelastine  -repeat check Vitamins B6, B12, D   -wear CPAP during naps as well  -if you do not feel improved when back on Sertraline, I recommend evaluation by Psychiatry  -[facility], please let me know if you need a referral to our immediately available IOP option   -if no improvement in cognitive function and fatigue level in 1-2 months I recommend we refer you to Neuropsychology Rehabilitation  .brain  Stop B6, decrease B12, D 2K daily    11/2022: Fatigue, cognitive changes, sinus congestion, intermittent constipation and diarrhea, mood changes  -follow-up with blood work orders, please have this drawn in the morning and fasting  .juanito  -follow-up with therapist to address mood changes as scheduled  .fog  -try Azelastine nose spray to help with congestion  -to schedule with [facility] call [phone]  .ibs  .brain  Stop  B1 and B6, decrease B12, ? D supplement dose    08/2022:  Fatigue, cognitive changes, sinus congestion, intermittent constipation, mood changes  .blood  .hsat  .fog  .con  -for intermittent constipation, try taking Miralax or a Magnesium supplement with your iron supplement. Magnesium Glycinate causes less GI side effects, Magnesium Oxide and Citrate can help with GI motility  -Flonase  .brain         Relevant Orders    Referral to Occupational Therapy

## 2023-11-26 ENCOUNTER — APPOINTMENT (OUTPATIENT)
Dept: OTHER | Facility: CLINIC | Age: 38
End: 2023-11-26
Payer: COMMERCIAL

## 2023-11-27 ENCOUNTER — APPOINTMENT (OUTPATIENT)
Dept: OTHER | Facility: CLINIC | Age: 38
End: 2023-11-27
Payer: COMMERCIAL

## 2023-11-27 ENCOUNTER — TELEMEDICINE (OUTPATIENT)
Dept: OTHER | Facility: CLINIC | Age: 38
End: 2023-11-27
Payer: COMMERCIAL

## 2023-11-27 DIAGNOSIS — U09.9 POST-ACUTE SEQUELAE OF COVID-19 (PASC): Primary | ICD-10-CM

## 2023-11-27 PROCEDURE — 99213 OFFICE O/P EST LOW 20 MIN: CPT | Mod: ZK,95 | Performed by: NURSE PRACTITIONER

## 2023-11-27 PROCEDURE — 99213 OFFICE O/P EST LOW 20 MIN: CPT | Performed by: NURSE PRACTITIONER

## 2023-11-27 NOTE — PATIENT INSTRUCTIONS
"It was my pleasure seeing you in the [facility] Recovery Clinic today.  We will focus on addressing the following symptoms discussed today: fatigue, cognitive changes, anxiety and depression    My recommendations are as follows:  -stop Vitamin B6 supplementation, we will recheck your level at time of follow-up  -continue close follow-up with your specialists and their recommendations  -referral to Occupational Therapist Radhika Morales, let me know if you have any difficulty scheduling this  -extending work accommodations through 2/29    Tips to help improve brain fog and fatigue:  --avoid drinking Alcohol while recovering from COVID  --ensure to practice 30 minutes of exercise 7 days per week to keep BNDGF (brain derived neurotrophic growth factor) elevated as this will help in the regeneration of neurons, you may split exercise time up into 5 minute increments if this is better tolerated.   --Focus on eating a whole foods rich in fiber such as vegetables, fruits, beans, nuts, legumes, seeds, whole grains. Avoid processed foods and beverages. Eliminate added sugars, artificial sweeteners, processed oils, artificial dyes.  --slowly increase your activity by no more than 10% per week, rest when you feel tired  --utilize pacing techniques to manage fatigue, schedule rest times throughout the day so you do not run out of energy, more information to be found on this here: http://www.[facility]sa.ca/health-info-site/Documents/post_covid-19_fatigue.pdf  --Review the  Health Talk on Managing Fatigue and Thinking Changes after COVID-19 here: https://www.hospitals.org/Health-Talks/articles/2022/05/managing-fatigue-and-thinking-changes-after-covid-19  --You can also find many helpful tips and tricks in this book: \"The Long COVID self-help guide, practical ways to manage symptoms\" by [facility] Specialists from the Post[facility]COVID [facility]  --another book you may find helpful: \"Clearing the Fog\" by Dr. Daryl Tovar       Please return to COVID " Recovery Clinic in 3 months, call [phone] or send a message through your [employer] leslie if needed.    Please also consider attending  PICS support group for long-COVID and post-ICU patients. To contact support group, email [email or web address] or call [phone]

## 2023-11-27 NOTE — LETTER
C[facility] Recovery Clinic  [facility]  [address], [address]  [place] [address]  [phone] Phone  [phone] Fax    23     To whom it may concern,    Jason Claude  ( 1985) is currently under my care for symptoms of PASC (Post-Acute Sequelae of COVID-19).    To help manage his fatigue and cognitive/memory changes, I request that he has the ability to work remotely from home for the next three months.    Please contact our clinic with any questions or concerns at [phone].    Sincerely,    Simi Guy, CNP

## 2023-12-22 DIAGNOSIS — E11.65 TYPE 2 DIABETES MELLITUS WITH HYPERGLYCEMIA (MULTI): ICD-10-CM

## 2023-12-22 RX ORDER — METFORMIN HYDROCHLORIDE 500 MG/1
1000 TABLET, EXTENDED RELEASE ORAL 2 TIMES DAILY
Qty: 360 TABLET | Refills: 3 | Status: SHIPPED | OUTPATIENT
Start: 2023-12-22 | End: 2024-02-13 | Stop reason: DRUGHIGH

## 2024-01-02 NOTE — PROGRESS NOTES
Patient: Jason Claude    [id number]  : 1985 -- AGE 38 y.o.    Provider: Nati Messina MD PhD     Location [facility]   Service Date: 1/3/2024              [facility]  Followup Visit Note      HISTORY OF PRESENT ILLNESS     The patient's referring provider is: Reyes Reeves MD     HISTORY OF PRESENT ILLNESS   Jason Claude is a 38 y.o. male with past medical history significant for BRANDEN, T2DM, GERD, HLD, depression, history of COVID with sequela of fatigue, obesity who presents to a [facility] for followup.     PAST SLEEP HISTORY  Patient had been referred for HSAT on 22 at a BMI of 36. THe AHI3% was 58/h and the AHI4% was 43/h. The T88 was 19 min and the O2 cole was 78%. Seen 10/26/22 to get started on CPAP. Had COVID . He was then seen in the long-haul COVID since 2022. As part of an evaluation he was recommended to have sleep study. Precovid was getting 5-6h of sleep and doing ok. Post-COVID with 7-8h and feeling fatigue and brain fog. Sometime naps 1.5-2h if needs to at [id number]. He is on APAP 5-15 to treat his sleep apnea.     CURRENT HISTORY  At his last visit,  was using CPAP with some improvements in fatigue but not as much as he was hoping. He was napping during the day 2-3x with several hours of sleep. He was seeing a neuropsychologist an was on Wellbutrin. Started him on Sunosi at 75mg to see if improved his residual sleepiness.    On today's visit, the patient reports he is continuing to use CPAP regularly. He uses but does find it a nuisance.  He is using nasal pillows. He is not having snoring.    Since he started Sunosi he has not noticed any benefits in terms of fatigue. He feels more benefits from fatigue. He has been taking every day. Its is hard for him to differentiate a low energy state and being sleepiness.    Sleep schedule:    In bed: 10:30PM  Activities in bed:  Bedtime Activities: turns out  the lights  Subjective sleep latency:  5-10m  Awakenings during night: 0-1  Length of awakenings: 0-15m  Final awakening time: 5:40am  Out of bed: alarm snooze x 5m x2 and then for shower  Overall estimate of total sleep time: 6-7h    Weekends: Awaken at 6:50am  Preferred sleeping position: sidelying  Typically sleeps with his wife.       PAP Adherence:  DURABLE MEDICAL EQUIPMENT COMPANY: [employer]  Machine: THERAPY: RESMED AIRSENSE 11 PRESSURE SETTINGS: 5 - 15 cm H2O  Mask: MASK TYPE: NASAL PILLOWS MASK      Daytime Symptoms  On awakening patient reports: wake unrefreshed    Daytime: During the day, he finds himself falling asleep by noon. At Sabbath he has fallen asleep while kneeling.    ESS: 19  TACOS: 14  FOSQ: 24      REVIEW OF SYSTEMS     REVIEW OF SYSTEMS  Review of Systems   All other systems reviewed and are negative.        ALLERGIES AND MEDICATIONS     ALLERGIES  No Known Allergies    MEDICATIONS: He has a current medication list which includes the following prescription(s): acetaminophen - Take by mouth, bacillus coagulans/inulin - Take by mouth, advanced gluc meter test strip - Use to test two times daily, colchicine, ferrous gluconate - Take by mouth, metformin xr - TAKE 2 TABLETS BY MOUTH TWICE A DAY, multivitamin - Take by mouth, naproxen sodium - Take 1 tablet (220 mg) by mouth every 12 hours if needed for mild pain (1 - 3), omega 3-dha-epa-fish oil - Take 1 capsule (1,000 mg) by mouth 2 times a day, sertraline - Take 3 tablets (150 mg) by mouth once daily, sunosi - Take 1 tablet by mouth once daily, mounjaro - Inject 10 mg under the skin 1 (one) time per week, armodafinil - Take 1 tablet (150 mg) by mouth once daily for 14 days, indomethacin, and sumatriptan - Take 1 tablet (50 mg) by mouth 1 time if needed for migraine. May repeat after 2 hours. Max dose is 200mg/24 hours (Patient not taking: Reported on 1/3/2024).    PAST MEDICAL HISTORY : He has Type 2 diabetes mellitus with  "microalbuminuria (CMS/HCC); Severe obstructive sleep apnea; Combined hyperlipidemia; Migraines; Class 1 obesity due to excess calories with serious comorbidity and body mass index (BMI) of 34.0 to 34.9 in adult; Arthritis of spine; Cognitive changes; Fatigue; Hypersomnia; Insulin resistance; Intermittent constipation; Onychomycosis; Pes planus; Diabetes mellitus with microalbuminuria (CMS/HCC); Persistent depressive disorder with anxious distress, currently moderate; and Post-acute sequelae of COVID-19 (PASC) on their problem list.    PAST SURGICAL HISTORY: He  has a past surgical history that includes Refractive surgery (08/04/2015).     FAMILY HISTORY: No changes since previous visit. Otherwise non-contributory as charted.     SOCIAL HISTORY  He  reports that he has never smoked. He has never used smokeless tobacco. No history on file for alcohol use and drug use. He was in the [employer] at one point; Now he works at [employer],       PHYSICAL EXAM     Physical Examination: /69 (BP Location: Right arm, Patient Position: Sitting, BP Cuff Size: Large adult)   Pulse 65   Temp 36.3 °C (97.3 °F) (Temporal)   Ht 1.778 m (5' 10\")   Wt 110 kg (242 lb 12.8 oz)   BMI 34.84 kg/m²     PREVIOUS WEIGHTS:  Wt Readings from Last 3 Encounters:   01/03/24 110 kg (242 lb 12.8 oz)   09/05/23 110 kg (243 lb)   07/19/23 110 kg (242 lb 6 oz)       General: The patient is a pleasant male, in no acute distress. HEENT: He has a  a modified Mallampati grade 3 airway with Numbers; 0-4 (with +): 1+ tonsils bilaterally. The soft palate was normal and the uvula was normal. The A/P diameter of the velopharynx was narrowed. The oropharynx was shallow in the A/P diameter. Lateral wall narrowing was present. Tongue ridging was notpresent. Erythema of the posterior pharynx was not present. Mucosal hypertrophy in the posterior oropharynx was not present. Retrognathia was not and micrognathia was not present. Neck: The neck was not " enlarged. No JVP, bruits or lymphadenopathy was appreciated. Chest: Clear to auscultation. No wheezes, rales, or rhonchi. Cardiovascular: Regular rate and rhythm. No murmurs, gallops, or clicks. Abdomen: Soft, nontender, nondistended. Positive bowel sounds. Extremities: No clubbing, cyanosis, or edema is noted. Neurologic exam: Alert, oriented x3 and was grossly non-focal.      RESULTS/DATA     Iron (ug/dL)   Date Value   09/05/2023 63   11/29/2022 56     Iron Saturation (%)   Date Value   09/05/2023 15 (L)   11/29/2022 13 (L)     TIBC (ug/dL)   Date Value   09/05/2023 433   11/29/2022 448 (H)     Ferritin (ug/L)   Date Value   09/05/2023 48   11/29/2022 35       Bicarbonate (mmol/L)   Date Value   07/12/2023 30   04/24/2023 26   11/29/2022 27       PAP Adherence  A PAP adherence download was obtained and data was reviewed personally today in clinic. (see scanned document in EPIC)        DIAGNOSES     Problem List and Orders  Diagnoses and all orders for this visit:  BRANDEN (obstructive sleep apnea)  -     armodafinil (Nuvigil) 150 mg tablet; Take 1 tablet (150 mg) by mouth once daily for 14 days.  -     Follow Up In Adult Sleep Medicine; Future  Hypersomnia  -     armodafinil (Nuvigil) 150 mg tablet; Take 1 tablet (150 mg) by mouth once daily for 14 days.  -     Follow Up In Adult Sleep Medicine; Future        ASSESSMENT/PLAN     Mr. Claude is a 38 y.o. male and with past medical history significant for BRANDEN, T2DM, GERD, HLD, depression, history of COVID with sequela of fatigue, obesity He returns in followup to  the [facility] for their BRNADEN.      BRANDEN on PAP: Overall, Mr. Claude is doing well with the use of PAP therapy for his BRANDEN. Compared to before starting PAP, he continues to use and benefit from use of the PAP device and has observed improvements in snoring, but not fatigue or sleepiness. Thus, continued use of PAP was recommended and to use for the entire sleep period. On PAP, he  "doesn't snore, have witnessed apneas, gasp for air, or kick/thrash. Objective compliance data also suggest excellent efficacy in the home setting. At a minimum he should use PAP for at least 4 hours/night, however, \"all night, every night\" is best. In addition, PAP use during naps are also encouraged. Mr. Claude was advised to contact him DME to obtain replenishment supplies for him PAP every 3 months or as needed or for other equipment specific issues.    Daytime sleepiness/fatigue.  He has difficulties distinguishing between the 2 however the his ESS is very high at 19.  Solriamfetol has not been beneficial in improving his sleepiness at this point in time.  Given this we will stop the medication and we will transition him to armodafinil at 150 mg for the next 2 weeks.  If no benefit then we will increase it up to 250 mg daily.    If this medication does not work we could consider the use of more traditional stimulants such as methylphenidate or dextroamphetamine.  There would be concern from the standpoint of longer-term side effects from a cardiovascular health perspective such as blood pressure.    The patient understands this and is signed a controlled substance agreement for the armodafinil today.    Follow up: 3 months -perform urine testing at that time.    Obesity/Overweight.  The patient was counseled that his weight is the strongest modifiable risk factor and contributor for BRANDEN. He was counseled to consider weight loss options to include changes in dietary habits and activity. We briefly discussed the use of calorie tracking smartphone apps and the use of activity meters to provide feedback that helps in losing weight.  Before initiating an exercise plan, he should carefully review the approach with his primary care provider.          Nati Messina MD PhD     "

## 2024-01-03 ENCOUNTER — OFFICE VISIT (OUTPATIENT)
Dept: SLEEP MEDICINE | Facility: CLINIC | Age: 39
End: 2024-01-03
Payer: COMMERCIAL

## 2024-01-03 VITALS
BODY MASS INDEX: 34.76 KG/M2 | WEIGHT: 242.8 LBS | SYSTOLIC BLOOD PRESSURE: 113 MMHG | HEIGHT: 70 IN | TEMPERATURE: 97.3 F | HEART RATE: 65 BPM | DIASTOLIC BLOOD PRESSURE: 69 MMHG

## 2024-01-03 DIAGNOSIS — E66.09 CLASS 1 OBESITY DUE TO EXCESS CALORIES WITH SERIOUS COMORBIDITY AND BODY MASS INDEX (BMI) OF 34.0 TO 34.9 IN ADULT: ICD-10-CM

## 2024-01-03 DIAGNOSIS — G47.33 OSA (OBSTRUCTIVE SLEEP APNEA): Primary | ICD-10-CM

## 2024-01-03 DIAGNOSIS — G47.10 HYPERSOMNIA: ICD-10-CM

## 2024-01-03 PROCEDURE — 3008F BODY MASS INDEX DOCD: CPT | Performed by: INTERNAL MEDICINE

## 2024-01-03 PROCEDURE — 3074F SYST BP LT 130 MM HG: CPT | Performed by: INTERNAL MEDICINE

## 2024-01-03 PROCEDURE — 99214 OFFICE O/P EST MOD 30 MIN: CPT | Performed by: INTERNAL MEDICINE

## 2024-01-03 PROCEDURE — 1036F TOBACCO NON-USER: CPT | Performed by: INTERNAL MEDICINE

## 2024-01-03 PROCEDURE — 3078F DIAST BP <80 MM HG: CPT | Performed by: INTERNAL MEDICINE

## 2024-01-03 RX ORDER — ARMODAFINIL 150 MG/1
150 TABLET ORAL DAILY
Qty: 14 TABLET | Refills: 1 | Status: SHIPPED | OUTPATIENT
Start: 2024-01-03 | End: 2024-01-24 | Stop reason: SDUPTHER

## 2024-01-03 NOTE — PROGRESS NOTES
OARRS:  Nati Messina MD PhD on 1/3/2024  8:45 AM  I have personally reviewed the OARRS report for Jason Claude. I have considered the risks of abuse, dependence, addiction and diversion    Is the patient prescribed a combination of a benzodiazepine and opioid?  No    Last Urine Drug Screen / ordered today: No  No results found for this or any previous visit (from the past 8760 hour(s)).  N/A    Clinical rationale for not completing a Urine Drug Screen: Will get at next visit if continues on the medication      Controlled Substance Agreement:  Date of the Last Agreement: 1/3/2024   Reviewed Controlled Substance Agreement including but not limited to the benefits, risks, and alternatives to treatment with a Controlled Substance medication(s).    Stimulants:   What is the patient's goal of therapy? Improve sleepiness/fatigue  Is this being achieved with current treatment? Not with solriamfetol    Activities of Daily Living:   Is your overall impression that this patient is benefiting (symptom reduction outweighs side effects) from stimulant therapy? TBD

## 2024-01-03 NOTE — PATIENT INSTRUCTIONS
[facility] Sleep Medicine  [address]  [facility]  [address]  [facility] [address]    [facility] [place]  [address]   [facility] [id number] [phone]  [facility]  [address]  [address]  [facility] [address]           NAME: Jason Claude   DATE: 1/3/2024     Your Sleep Provider Today: Nati Messina MD PhD  Your Primary Care Physician: Reyes Reeves MD   Your Referring Provider: No ref. provider found    Thank you for coming to the Sleep Medicine Clinic today! Your sleep medicine provider today was: Nati Messina MD PhD Below is a summary of your treatment plan, other important information, and our contact numbers:  If you need to schedule an appointment, please call [phone])  If you need general assistance (e.g. forms completed, general questions), please call my , Jossie, at [phone].  If you have a medical question about your sleep issues, please contact our nurses, Sophie or Anna at [phone].   You can also contact us through [employer].      DIAGNOSIS:   1. BRANDEN (obstructive sleep apnea)  armodafinil (Nuvigil) 150 mg tablet      2. Hypersomnia  armodafinil (Nuvigil) 150 mg tablet              TREATMENT PLAN     Instructions - Common BRANDEN Recs: - For your sleep apnea, continue to use your PAP every night and use it whenever you are sleeping.   - Avoid alcohol or sedatives several hours prior to sleeping.   - Get additional supplies for your PAP (e.g., mask, hose, filters) every 3 months or as your insurance allows from your [employer] company. Replacement cushions for your PAP mask can be requested monthly if airseals are an issue.  - Remember to clean your mask, tubings, and water chamber regularly as instructed.  - Avoid driving or operating heavy machinery when drowsy. A person driving while sleepy is five (5) times more likely to have an accident. If you feel  sleepy, pull over and take a short power nap (sleep for less than 30 minutes). Otherwise, ask somebody to drive you.    - We will stop the Sunosi  - We started armodafinil at 150mg daily  - Try for 2 weeks and let me know by [employer] how things are  - We can increase to 250mg daily      Follow-up Appointment:   Followup with me in 2-3 months.      IMPORTANT INFORMATION     Call 911 for medical emergencies.  Our offices are generally open from Monday-Friday, 9 am - 5 pm.  If you need to get in touch with me, you may either call me and my team(number is below) or you can use [employer].  If a referral for a test, for CPAP, or for another specialist was made, and you have not heard about scheduling this within a week, please call scheduling at [phone]).  If you are unable to make your appointment for clinic or an overnight study, kindly call the office at least 48 hours in advance to cancel and reschedule.  If you are on CPAP, please bring your device's card or the device to each clinic appointment.   There are no supporting services by either the sleep doctors or their staff on weekends and Holidays, or after 5 PM on weekdays.   If you have been asked to come to a sleep study, make sure you bring toiletries, a comfy pillow, and any nighttime medications that you may regularly take. Also be sure to eat dinner before you arrive. We generally do not provide meals.      PRESCRIPTIONS     We require 7 days advanced notice for prescription refills. If we do not receive the request in this time, we cannot guarantee that your medication will be refilled in time.      IMPORTANT PHONE NUMBERS      scheduling for medical testin537 - [phone]   Sleep Medicine Clinic Fax: [phone]  Appointments (for Pediatric Sleep Clinic): [phone] - option 1  Appointments (for Adult Sleep Clinic): [phone] - option 2  Appointments (For Sleep Studies): [phone] - option 3  Behavioral  Sleep Medicine: [phone]  Sleep Surgery: [phone]  ENT (Otolaryngology): [phone]  Headache Clinic (Neurology): [phone]  Neurology: [phone]  Psychiatry: [phone]  Pulmonary Function Testing (PFT) Center: [phone]  Pulmonary Medicine: [phone]  [employer] (DME): [phone]  [employer] (DME): [phone]  [facility] ([facility]): [phone]      OUR ADULT SLEEP MEDICINE TEAM   Please do not hesitate to call the office or sleep nurse with any questions between appointments:    Adult Sleep Nurses (Anna Lundy, RN and Sophie Ramey RN):  For clinical questions and refilling prescriptions: [phone]  Email sleep diaries and other documents at: [email or web address]    Adult Sleep Medicine Secretaries:  Cristiana Jacques (For Yael/Ojeda/Krmarino/Esau/Shree/Nik):   P: [phone]  F: [phone]  Jossie Mccabe (For Messina/Guggenbiller): P: [phone]  Fax: [phone]  Salome Cordova (For Xochitl/Blank): P: [phone]  F: [phone]  Miranda Alas (For Vickie): P: [phone]  F: [phone]  Patsy Hanson (For Yanira/Gregorio/Zakhary): P: [phone]  F: [phone]  Kalyn Khan (For Harp/Watts): P: [phone]  F: [phone]     Adult Sleep Medicine Advanced Practice Providers:  Chalo Swift ([place], [place])  Majo Perkins ([facility])  Rhona Garcia CNP ([place], [place], [place])  Gladis Cruz CNP ([place], [place], [place])  Jazzy Johnston ([facility] [place])  Eusebio Watts CNP ([facility])        OUR SLEEP TESTING LOCATIONS     Our team will contact you to schedule your sleep study, however, you can contact us as follow:  Main Phone Line (scheduling only): [phone]), option 3  Adult and Pediatric Locations  [facility] (6 years and older): [facility] - 4th floor ([facility]) After hours line:  "[phone]  [facility] (Main campus: All ages): [facility], 6th floor. After hours line: [phone]   [place] (5 years and older; younger considered on case-by-case basis): [address]; [address], [address]. Scheduling  After hours line: [phone]   [place] (6 years and older): [address]; [address]; [address]   [place] (6 years and older): [address] [facility], [address]  After hours line: [phone]   [place] (13 years and older) in [place]: [address], 2nd floor  After hours line: [phone]   [place] (13 year and older): [address]  After hours line: [phone]     Adult Only Locations:   [place] (18 years and older): [address] [place] [address], 2nd floor   [place] (18 years and older): [address] [facility]; 4th floor  After hours line: [phone]   [place] (18 years and older) at [facility]: [address] [place] [address]  After hours line: [phone]          CONTACTING YOUR SLEEP MEDICINE PROVIDER     Send a message directly to your provider through \"My Chart\", which is the email service through your  Records Account: [email or web address]   Call [phone] and leave a message. One of the administrative assistants will forward the message to your sleep medicine provider through \"My Chart\" and/or email.     Your sleep medicine provider for this visit was: Nati Messina MD PhD        "

## 2024-01-06 DIAGNOSIS — F41.8 DEPRESSION WITH ANXIETY: Primary | ICD-10-CM

## 2024-01-08 RX ORDER — SERTRALINE HYDROCHLORIDE 100 MG/1
TABLET, FILM COATED ORAL
Qty: 90 TABLET | Refills: 2 | Status: SHIPPED | OUTPATIENT
Start: 2024-01-08 | End: 2024-01-24 | Stop reason: WASHOUT

## 2024-01-09 SDOH — HEALTH STABILITY: MENTAL HEALTH
STRESS IS WHEN SOMEONE FEELS TENSE, NERVOUS, ANXIOUS, OR CAN'T SLEEP AT NIGHT BECAUSE THEIR MIND IS TROUBLED. HOW STRESSED ARE YOU?: RATHER MUCH

## 2024-01-09 SDOH — HEALTH STABILITY: PHYSICAL HEALTH: ON AVERAGE, HOW MANY DAYS PER WEEK DO YOU ENGAGE IN MODERATE TO STRENUOUS EXERCISE (LIKE A BRISK WALK)?: 2 DAYS

## 2024-01-09 SDOH — ECONOMIC STABILITY: INCOME INSECURITY: HOW HARD IS IT FOR YOU TO PAY FOR THE VERY BASICS LIKE FOOD, HOUSING, MEDICAL CARE, AND HEATING?: NOT VERY HARD

## 2024-01-09 SDOH — ECONOMIC STABILITY: HOUSING INSECURITY
IN THE LAST 12 MONTHS, WAS THERE A TIME WHEN YOU DID NOT HAVE A STEADY PLACE TO SLEEP OR SLEPT IN A SHELTER (INCLUDING NOW)?: NO

## 2024-01-09 SDOH — ECONOMIC STABILITY: TRANSPORTATION INSECURITY
IN THE PAST 12 MONTHS, HAS THE LACK OF TRANSPORTATION KEPT YOU FROM MEDICAL APPOINTMENTS OR FROM GETTING MEDICATIONS?: NO

## 2024-01-09 SDOH — HEALTH STABILITY: PHYSICAL HEALTH: ON AVERAGE, HOW MANY MINUTES DO YOU ENGAGE IN EXERCISE AT THIS LEVEL?: 10 MIN

## 2024-01-09 SDOH — ECONOMIC STABILITY: HOUSING INSECURITY: IN THE LAST 12 MONTHS, HOW MANY PLACES HAVE YOU LIVED?: 1

## 2024-01-09 SDOH — ECONOMIC STABILITY: FOOD INSECURITY: WITHIN THE PAST 12 MONTHS, THE FOOD YOU BOUGHT JUST DIDN'T LAST AND YOU DIDN'T HAVE MONEY TO GET MORE.: NEVER TRUE

## 2024-01-09 SDOH — ECONOMIC STABILITY: TRANSPORTATION INSECURITY
IN THE PAST 12 MONTHS, HAS LACK OF TRANSPORTATION KEPT YOU FROM MEETINGS, WORK, OR FROM GETTING THINGS NEEDED FOR DAILY LIVING?: NO

## 2024-01-09 SDOH — ECONOMIC STABILITY: INCOME INSECURITY: IN THE LAST 12 MONTHS, WAS THERE A TIME WHEN YOU WERE NOT ABLE TO PAY THE MORTGAGE OR RENT ON TIME?: NO

## 2024-01-09 SDOH — ECONOMIC STABILITY: FOOD INSECURITY: WITHIN THE PAST 12 MONTHS, YOU WORRIED THAT YOUR FOOD WOULD RUN OUT BEFORE YOU GOT MONEY TO BUY MORE.: NEVER TRUE

## 2024-01-09 SDOH — ECONOMIC STABILITY: FOOD INSECURITY: WITHIN THE PAST 12 MONTHS, THE FOOD YOU BOUGHT JUST DIDN’T LAST AND YOU DIDN’T HAVE MONEY TO GET MORE.: NEVER TRUE

## 2024-01-09 ASSESSMENT — PATIENT HEALTH QUESTIONNAIRE - PHQ9
9. THOUGHTS THAT YOU WOULD BE BETTER OFF DEAD, OR OF HURTING YOURSELF: NOT AT ALL
8. MOVING OR SPEAKING SO SLOWLY THAT OTHER PEOPLE COULD HAVE NOTICED. OR THE OPPOSITE, BEING SO FIGETY OR RESTLESS THAT YOU HAVE BEEN MOVING AROUND A LOT MORE THAN USUAL: NOT AT ALL
3. TROUBLE FALLING OR STAYING ASLEEP OR SLEEPING TOO MUCH: NEARLY EVERY DAY
10. IF YOU CHECKED OFF ANY PROBLEMS, HOW DIFFICULT HAVE THESE PROBLEMS MADE IT FOR YOU TO DO YOUR WORK, TAKE CARE OF THINGS AT HOME, OR GET ALONG WITH OTHER PEOPLE: SOMEWHAT DIFFICULT
2. FEELING DOWN, DEPRESSED OR HOPELESS: MORE THAN HALF THE DAYS
8. MOVING OR SPEAKING SO SLOWLY THAT OTHER PEOPLE COULD HAVE NOTICED. OR THE OPPOSITE - BEING SO FIDGETY OR RESTLESS THAT YOU HAVE BEEN MOVING AROUND A LOT MORE THAN USUAL: NOT AT ALL
7. TROUBLE CONCENTRATING ON THINGS, SUCH AS READING THE NEWSPAPER OR WATCHING TELEVISION: NEARLY EVERY DAY
1. LITTLE INTEREST OR PLEASURE IN DOING THINGS: SEVERAL DAYS
6. FEELING BAD ABOUT YOURSELF - OR THAT YOU ARE A FAILURE OR HAVE LET YOURSELF OR YOUR FAMILY DOWN: NEARLY EVERY DAY
SUM OF ALL RESPONSES TO PHQ QUESTIONS 1-9: 18
10. IF YOU CHECKED OFF ANY PROBLEMS, HOW DIFFICULT HAVE THESE PROBLEMS MADE IT FOR YOU TO DO YOUR WORK, TAKE CARE OF THINGS AT HOME, OR GET ALONG WITH OTHER PEOPLE: SOMEWHAT DIFFICULT
2. FEELING DOWN, DEPRESSED OR HOPELESS: MORE THAN HALF THE DAYS
4. FEELING TIRED OR HAVING LITTLE ENERGY: NEARLY EVERY DAY
4. FEELING TIRED OR HAVING LITTLE ENERGY: NEARLY EVERY DAY
6. FEELING BAD ABOUT YOURSELF - OR THAT YOU ARE A FAILURE OR HAVE LET YOURSELF OR YOUR FAMILY DOWN: NEARLY EVERY DAY
5. POOR APPETITE OR OVEREATING: NEARLY EVERY DAY
3. TROUBLE FALLING OR STAYING ASLEEP: NEARLY EVERY DAY
SUM OF ALL RESPONSES TO PHQ9 QUESTIONS 1 & 2: 3
9. THOUGHTS THAT YOU WOULD BE BETTER OFF DEAD, OR OF HURTING YOURSELF: NOT AT ALL
7. TROUBLE CONCENTRATING ON THINGS, SUCH AS READING THE NEWSPAPER OR WATCHING TELEVISION: NEARLY EVERY DAY
5. POOR APPETITE OR OVEREATING: NEARLY EVERY DAY
1. LITTLE INTEREST OR PLEASURE IN DOING THINGS: SEVERAL DAYS

## 2024-01-09 ASSESSMENT — LIFESTYLE VARIABLES
HOW OFTEN DO YOU HAVE 6 OR MORE DRINKS ON ONE OCCASION: NEVER
HOW MANY STANDARD DRINKS CONTAINING ALCOHOL DO YOU HAVE ON A TYPICAL DAY: 1 OR 2
USE_ALCOHOL_TO_HELP_SLEEP: YES
AUDIT-C TOTAL SCORE: 3
HOW OFTEN DO YOU HAVE SIX OR MORE DRINKS ON ONE OCCASION: NEVER
SKIP TO QUESTIONS 9-10: 1
USE_PILLS_ALCOHOL_TO_HELP_SLEEP: RARELY
HOW MANY STANDARD DRINKS CONTAINING ALCOHOL DO YOU HAVE ON A TYPICAL DAY: 1 OR 2
DO_YOU_DRINK?: NO DIFFERENCE
HOW OFTEN DO YOU HAVE A DRINK CONTAINING ALCOHOL: 2-3 TIMES A WEEK
HOW OFTEN DO YOU HAVE A DRINK CONTAINING ALCOHOL: 2-3 TIMES A WEEK

## 2024-01-09 ASSESSMENT — SLEEP AND FATIGUE QUESTIONNAIRES
EXERCISE_BRINGS_ON_FATIGUE: 5
AVERAGE_FSS_SCORE: 6.33
FATIGUE_INTERFERES_SOCIAL_LIFE: 7 STRONGLY AGREE
FATIGUE_INTERFERES_PHYSICAL_FUNCTIONING: 7 STRONGLY AGREE
MY FATIGUE PREVENTS SUSTAINED PHYSICAL FUNCTIONING.: 6
MY MOTIVATION IS LOWER WHEN I AM FATIGUED.: 7 STRONGLY AGREE
VISUAL ANALOGUE FATIGUE SCALE (VAFS): 8
MY FATIGUE PREVENTS SUSTAINED PHYSICAL FUNCTIONING.: 6
FATIGUE_MOST_DISABILING_SYMPTOM: 6
FATIGUE_INTERFERES_PHYSICAL_FUNCTIONING: 7 STRONGLY AGREE
FATIGUE_CAUSES_FREQUENT_PROBLEMTS: 6
FATIGUE_INTERFERES_SOCIAL_LIFE: 7 STRONGLY AGREE
EASILY_FATIGUED: 6
FATIGUE_INTERFERES_RESPONSIBILITIES: 7 STRONGLY AGREE
EXERCISE_BRINGS_ON_FATIGUE: 5
FATIGUE_INTERFERES_RESPONSIBILITIES: 7 STRONGLY AGREE
EASILY_FATIGUED: 6
FATIGUE_MOST_DISABILING_SYMPTOM: 6
FATIGUE_CAUSES_FREQUENT_PROBLEMTS: 6
MY MOTIVATION IS LOWER WHEN I AM FATIGUED.: 7 STRONGLY AGREE

## 2024-01-09 ASSESSMENT — SOCIAL DETERMINANTS OF HEALTH (SDOH)
WITHIN THE LAST YEAR, HAVE YOU BEEN HUMILIATED OR EMOTIONALLY ABUSED IN OTHER WAYS BY YOUR PARTNER OR EX-PARTNER?: NO
WITHIN THE LAST YEAR, HAVE YOU BEEN AFRAID OF YOUR PARTNER OR EX-PARTNER?: NO
WITHIN THE LAST YEAR, HAVE YOU BEEN HUMILIATED OR EMOTIONALLY ABUSED IN OTHER WAYS BY YOUR PARTNER OR EX-PARTNER?: NO
WITHIN THE LAST YEAR, HAVE YOU BEEN AFRAID OF YOUR PARTNER OR EX-PARTNER?: NO
HOW OFTEN DO YOU ATTEND CHURCH OR RELIGIOUS SERVICES?: MORE THAN 4 TIMES PER YEAR
IN A TYPICAL WEEK, HOW MANY TIMES DO YOU TALK ON THE PHONE WITH FAMILY, FRIENDS, OR NEIGHBORS?: ONCE A WEEK
IN THE PAST 12 MONTHS, HAS THE ELECTRIC, GAS, OIL, OR WATER COMPANY THREATENED TO SHUT OFF SERVICE IN YOUR HOME?: NO
HOW OFTEN DO YOU ATTENT MEETINGS OF THE CLUB OR ORGANIZATION YOU BELONG TO?: 1 TO 4 TIMES PER YEAR
WITHIN THE LAST YEAR, HAVE YOU BEEN KICKED, HIT, SLAPPED, OR OTHERWISE PHYSICALLY HURT BY YOUR PARTNER OR EX-PARTNER?: NO
IN A TYPICAL WEEK, HOW MANY TIMES DO YOU TALK ON THE PHONE WITH FAMILY, FRIENDS, OR NEIGHBORS?: ONCE A WEEK
HOW OFTEN DO YOU GET TOGETHER WITH FRIENDS OR RELATIVES?: ONCE A WEEK
WITHIN THE LAST YEAR, HAVE TO BEEN RAPED OR FORCED TO HAVE ANY KIND OF SEXUAL ACTIVITY BY YOUR PARTNER OR EX-PARTNER?: NO
HOW OFTEN DO YOU ATTENT MEETINGS OF THE CLUB OR ORGANIZATION YOU BELONG TO?: 1 TO 4 TIMES PER YEAR
WITHIN THE LAST YEAR, HAVE YOU BEEN KICKED, HIT, SLAPPED, OR OTHERWISE PHYSICALLY HURT BY YOUR PARTNER OR EX-PARTNER?: NO
HOW OFTEN DO YOU GET TOGETHER WITH FRIENDS OR RELATIVES?: ONCE A WEEK
IN THE PAST 12 MONTHS, HAS THE ELECTRIC, GAS, OIL, OR WATER COMPANY THREATENED TO SHUT OFF SERVICE IN YOUR HOME?: NO
DO YOU BELONG TO ANY CLUBS OR ORGANIZATIONS SUCH AS CHURCH GROUPS UNIONS, FRATERNAL OR ATHLETIC GROUPS, OR SCHOOL GROUPS?: YES
HOW OFTEN DO YOU ATTEND CHURCH OR RELIGIOUS SERVICES?: MORE THAN 4 TIMES PER YEAR
DO YOU BELONG TO ANY CLUBS OR ORGANIZATIONS SUCH AS CHURCH GROUPS UNIONS, FRATERNAL OR ATHLETIC GROUPS, OR SCHOOL GROUPS?: YES
WITHIN THE LAST YEAR, HAVE TO BEEN RAPED OR FORCED TO HAVE ANY KIND OF SEXUAL ACTIVITY BY YOUR PARTNER OR EX-PARTNER?: NO

## 2024-01-09 ASSESSMENT — ANXIETY QUESTIONNAIRES
IF YOU CHECKED OFF ANY PROBLEMS ON THIS QUESTIONNAIRE, HOW DIFFICULT HAVE THESE PROBLEMS MADE IT FOR YOU TO DO YOUR WORK, TAKE CARE OF THINGS AT HOME, OR GET ALONG WITH OTHER PEOPLE: SOMEWHAT DIFFICULT
GAD7 TOTAL SCORE: 14
2. NOT BEING ABLE TO STOP OR CONTROL WORRYING: NEARLY EVERY DAY
4. TROUBLE RELAXING: MORE THAN HALF THE DAYS
1. FEELING NERVOUS, ANXIOUS, OR ON EDGE: NEARLY EVERY DAY
2. NOT BEING ABLE TO STOP OR CONTROL WORRYING: NEARLY EVERY DAY
6. BECOMING EASILY ANNOYED OR IRRITABLE: SEVERAL DAYS
7. FEELING AFRAID AS IF SOMETHING AWFUL MIGHT HAPPEN: SEVERAL DAYS
3. WORRYING TOO MUCH ABOUT DIFFERENT THINGS: MORE THAN HALF THE DAYS
7. FEELING AFRAID AS IF SOMETHING AWFUL MIGHT HAPPEN: SEVERAL DAYS
4. TROUBLE RELAXING: MORE THAN HALF THE DAYS
6. BECOMING EASILY ANNOYED OR IRRITABLE: SEVERAL DAYS
5. BEING SO RESTLESS THAT IT IS HARD TO SIT STILL: MORE THAN HALF THE DAYS
IF YOU CHECKED OFF ANY PROBLEMS ON THIS QUESTIONNAIRE, HOW DIFFICULT HAVE THESE PROBLEMS MADE IT FOR YOU TO DO YOUR WORK, TAKE CARE OF THINGS AT HOME, OR GET ALONG WITH OTHER PEOPLE: SOMEWHAT DIFFICULT
1. FEELING NERVOUS, ANXIOUS, OR ON EDGE: NEARLY EVERY DAY
3. WORRYING TOO MUCH ABOUT DIFFERENT THINGS: MORE THAN HALF THE DAYS
5. BEING SO RESTLESS THAT IT IS HARD TO SIT STILL: MORE THAN HALF THE DAYS

## 2024-01-23 ENCOUNTER — PATIENT MESSAGE (OUTPATIENT)
Dept: SLEEP MEDICINE | Facility: CLINIC | Age: 39
End: 2024-01-23
Payer: COMMERCIAL

## 2024-01-23 DIAGNOSIS — G47.33 OSA (OBSTRUCTIVE SLEEP APNEA): ICD-10-CM

## 2024-01-23 DIAGNOSIS — G47.10 HYPERSOMNIA: ICD-10-CM

## 2024-01-24 ENCOUNTER — TELEMEDICINE (OUTPATIENT)
Dept: BEHAVIORAL HEALTH | Facility: CLINIC | Age: 39
End: 2024-01-24
Payer: COMMERCIAL

## 2024-01-24 DIAGNOSIS — F34.1 PERSISTENT DEPRESSIVE DISORDER WITH ANXIOUS DISTRESS, CURRENTLY MILD: Primary | ICD-10-CM

## 2024-01-24 DIAGNOSIS — F34.1 PERSISTENT DEPRESSIVE DISORDER WITH ANXIOUS DISTRESS, CURRENTLY SEVERE: ICD-10-CM

## 2024-01-24 PROCEDURE — 99214 OFFICE O/P EST MOD 30 MIN: CPT | Performed by: STUDENT IN AN ORGANIZED HEALTH CARE EDUCATION/TRAINING PROGRAM

## 2024-01-24 RX ORDER — ARMODAFINIL 250 MG/1
250 TABLET ORAL DAILY
Qty: 30 TABLET | Refills: 1 | Status: SHIPPED | OUTPATIENT
Start: 2024-01-24 | End: 2024-03-27

## 2024-01-24 RX ORDER — SERTRALINE HYDROCHLORIDE 50 MG/1
150 TABLET, FILM COATED ORAL DAILY
Qty: 90 TABLET | Refills: 5 | Status: SHIPPED | OUTPATIENT
Start: 2024-01-24 | End: 2024-04-01 | Stop reason: SDUPTHER

## 2024-01-24 NOTE — ASSESSMENT & PLAN NOTE
Mood much better when taking action to meet his unaddressed needs, even if solutions are not long-term.  - connect with therapist to explore further  - practice awareness and look for solutions  - no medication change for now

## 2024-01-24 NOTE — PATIENT INSTRUCTIONS
[email or web address]    [email or web address]    Please send me a message in [employer] if things aren't going as expected or you are unsure about something we discussed. If this isn't working, you can call the psychiatry department line at [phone], or leave me a voicemail at [phone].  If you are having thoughts of harming yourself or ending your life, please call the national suicide hotline 24/7 at 930. For this and other mental health emergencies, such as losing touch with reality, call the Frontline 24/7 mobile crisis hotline at 778-909-0922, or dial 241 for emergency services.

## 2024-01-24 NOTE — PROGRESS NOTES
Outpatient Psychiatry Follow-Up    Subjective   Jason Claude is a 38 y.o. male with persistent depressive disorder with anxious distress, related to placing his needs last, leading to work and family stress.    Previous Treatment Plan    ICD-10-CM         Persistent depressive disorder with anxious distress, currently moderate - Primary F34.1       Caused by mental habit of suppressing personal needs, leading to frustration with feeling trapped in undesired outcomes, as well as intermittent explosive anger. Addiction-like behavior leads to brief pleasure followed by worsening depression.  - cont sertraline  - refer to therapy           Relevant Medications     sertraline (Zoloft) 50 mg tablet       Interim History  Doing okay, but still having a lot of fatigue, which affects his mental health. Medications seem to be helping.  Outbursts: Not really happening anymore  Values change action: Mood overall better with advancement of situation.  PsychologyToday for therapist - sounds like a good plan to him    Psychiatric Medications  Sertraline 150 mg daily    Objective   Mental Status Exam:  General Appearance: Well groomed, appropriate eye contact  Attitude/Behavior: Cooperative  Motor: No psychomotor agitation or retardation, no tremor or other abnormal movements  Speech: Normal rate, volume, prosody  Mood: fine  Affect: Euthymic, full-range  Thought Process: Linear, goal directed  Thought Associations: No loosening of associations  Thought Content: Normal  Perception: No perceptual abnormalities noted  Sensorium: Alert  Insight: Intact  Judgement: Intact  Cognition: Cognitively intact to conversational testing with respect to attention, orientation, fund of knowledge, recent and remote memory, and language    Lab Review:   Lab Results   Component Value Date     07/12/2023    K 4.1 07/12/2023     07/12/2023    CO2 30 07/12/2023    BUN 15 07/12/2023    CREATININE 0.92 07/12/2023    GLUCOSE 159 (H) 07/12/2023     CALCIUM 9.6 07/12/2023     Lab Results   Component Value Date    WBC 5.8 09/05/2023    HGB 13.7 09/05/2023    HCT 42.9 09/05/2023    MCV 83 09/05/2023     09/05/2023     Lab Results   Component Value Date    CHOL 197 09/05/2023    TRIG 214 (H) 09/05/2023    HDL 34.0 (A) 09/05/2023       Assessment/Plan     Problem List Items Addressed This Visit             ICD-10-CM    Persistent depressive disorder with anxious distress, currently mild F34.1     Mood much better when taking action to meet his unaddressed needs, even if solutions are not long-term.  - connect with therapist to explore further  - practice awareness and look for solutions  - no medication change for now         Relevant Medications    sertraline (Zoloft) 50 mg tablet     Other Visit Diagnoses         Codes    Persistent depressive disorder with anxious distress, currently severe     F34.1    Relevant Medications    sertraline (Zoloft) 50 mg tablet    Other Relevant Orders    Follow Up In Psychiatry            Next appointment with me in 6 month(s).    Suicide Risk Assessment  There are no new risk factors for suicide and imminent risk remains low; therefore, Jason Claude does not require further risk mitigation.    I provided the mobile crisis number and encouraged calling this, 988 or 911 in case of a mental health crisis, including feeling suicidal or losing touch with reality.

## 2024-02-07 NOTE — ASSESSMENT & PLAN NOTE
"    Clinical Pharmacy Note     Paul was referred by Dr. Mena for pharmacy services for hospital follow-up      MEDICATION REVIEW:  Discussed all medication indications, dosage and effectiveness, adverse effects, and adherence with patient/caregiver.    Pt had meds with them: no  Pt had med list with them: yes  Pt was knowledgeable about meds: yes  Medications set up by: self  Medications administered by someone else (e.g., LTCF): No  Pt uses a medication box or automated dispenser: no  Called pharmacy to obtain or clarify med list:  no  Called HHN or LTCF to obtain or clarify med list:  no    Medication Discrepancies  Medications on EMR med list that pt is NOT taking:  none  Medications pt IS taking that are NOT on EMR med list (e.g., from specialist, hospital): none  OTC meds/ dietary supplements pt taking on own that are NOT on EMR med list:  none  Dosage listed differently than how patient is taking: none  Frequency listed differently than how patient is taking: none  Duplicate medication on list (two occurrences of the same medication):  none  TOTAL NUMBER OF MEDICATION DISCREPANCIES:  0  ______________________________________________________________________      Subjective:  Paul is here today for hospital follow-up due to cocaine use relapse.  He is joined by his significant other who helps with some of the history today.    1)  Depression / Anxiety     Paul states that he relapsed on cocaine but has not been using since.    Today he appears somewhat \"up\" and his speech is somewhat pressured and quick.    In the hospital he states that his kidneys were also having trouble but we are unable to see the discharge summary from the hospital.    He requests refills on all of his medications.    He had complaints about the concussion clinic not contacting him for referral from a few weeks ago.      Patient reported being compliant most of the time   Patient reports no side effects.      Objective:    There " 02/2024:   fatigue, cognitive changes, anxiety and depression  -follow-up on referral to Occupational Therapist Kim Morales for cognitive rehabilitation and fatigue management, please call [phone] to set up a virtual or in-person appointment at [facility]   -continue close follow-up with sleep medicine, Psychiatry, and Psychology  -obtain Vitamin B6 repeat lab draw   -plan to extend work accommodations through July 2024 11/2023:   fatigue, cognitive changes, anxiety and depression  -stop Vitamin B6 supplementation, we will recheck your level at time of follow-up  -continue close follow-up with your specialists and their recommendations  -referral to Occupational Therapist Radhika Morales, let me know if you have any difficulty scheduling this  -extending work accommodations through 2/29 08/2023:  fatigue, cognitive changes, anxiety and depression  -recheck Vitamin B6, B12, and D levels  -continue close follow-up with your specialists and their recommendations  -continue to wear CPAP  -> stop B6    05/2023  fatigue, cognitive changes, anxiety and depression  -recheck Vitamin B6, B12, and D levels  -referral Psychiatry Dr. Maynard  -referral to [facility]  -follow-up with Neuropsychology Rehab and Sleep Medicine as scheduled  -continue to wear CPAP  Stop B6, decrease B12, D once daily    02/2023: fatigue, cognitive changes, sinus congestion, anxiety and depression  -restart nose sprays for sinus congestion. Take Flonase for 1-2 weeks and if not having sufficient improvement add Azelastine  -repeat check Vitamins B6, B12, D   -wear CPAP during naps as well  -if you do not feel improved when back on Sertraline, I recommend evaluation by Psychiatry  -[facility], please let me know if you need a referral to our immediately available IOP option   -if no improvement in cognitive function and fatigue level in 1-2 months I recommend we  refer you to Neuropsychology Rehabilitation  .brain  Stop B6, decrease B12, D 2K daily    11/2022: Fatigue, cognitive changes, sinus congestion, intermittent constipation and diarrhea, mood changes  -follow-up with blood work orders, please have this drawn in the morning and fasting  .juanito  -follow-up with therapist to address mood changes as scheduled  .fog  -try Azelastine nose spray to help with congestion  -[phone]  .ibs  .brain  Stop B1 and B6, decrease B12, ? D supplement dose    08/2022:  Fatigue, cognitive changes, sinus congestion, intermittent constipation, mood changes  .blood  .hsat  .fog  .con  -for intermittent constipation, try taking Miralax or a Magnesium supplement with your iron supplement. Magnesium Glycinate causes less GI side effects, Magnesium Oxide and Citrate can help with GI motility  -Flonase  .brain   were no vitals taken for this visit.  BP Readings from Last 1 Encounters:   04/18/19 122/86   ]      BP Readings from Last 6 Encounters:   04/18/19 122/86   04/03/19 110/75   02/21/19 (!) 149/94   01/09/19 132/82   01/01/19 136/85   10/24/18 99/65     Estimated Creatinine Clearance: 96.9 mL/min (based on SCr of 1 mg/dL).  GFR Estimate   Date Value Ref Range Status   04/18/2019 85.9 >60.0 mL/min/1.7 m2 Final   09/15/2018 67 >60 mL/min/1.7m2 Final     Comment:     Non  GFR Calc   09/10/2018 78 >60 mL/min/1.7m2 Final     Comment:     Non  GFR Calc     GFR Estimate If Black   Date Value Ref Range Status   04/18/2019 >90 >60.0 mL/min/1.7 m2 Final   09/15/2018 81 >60 mL/min/1.7m2 Final     Comment:      GFR Calc   09/10/2018 >90 >60 mL/min/1.7m2 Final     Comment:      GFR Calc     Immunization History   Administered Date(s) Administered     TDAP Vaccine (Boostrix) 05/11/2015     Patient Active Problem List   Diagnosis     Recurrent major depressive disorder, remission status unspecified (H)     Anxiety     Mood disorder (H)     Tobacco use disorder     Amphetamine substance use disorder, moderate, in early remission (H)     Postconcussion syndrome     SOCIAL HISTORY:   reports that he has been smoking.  He has a 3.75 pack-year smoking history. He has never used smokeless tobacco. He reports that he has current or past drug history. Drug: Methamphetamines. He reports that he does not drink alcohol.    Current Outpatient Medications   Medication Sig Dispense Refill     albuterol (PROAIR HFA/PROVENTIL HFA/VENTOLIN HFA) 108 (90 Base) MCG/ACT inhaler Inhale 2 puffs into the lungs every 4 hours as needed for shortness of breath / dyspnea or wheezing 18 g 1     buPROPion (WELLBUTRIN XL) 300 MG 24 hr tablet Take 1 tablet (300 mg) by mouth every morning 90 tablet 1     busPIRone (BUSPAR) 10 MG tablet Take 1 tablet (10 mg) by mouth 3 times daily 90 tablet 1      cloNIDine (CATAPRES) 0.1 MG tablet Take 1 tablet (0.1 mg) by mouth 3 times daily 60 tablet      mirtazapine (REMERON) 15 MG tablet   0     Multiple Vitamin (MULTI VITAMIN DAILY) TABS Take 1 tablet by mouth daily 180 tablet 1     propranolol (INDERAL) 10 MG tablet Take 1 tablet (10 mg) by mouth 3 times daily 90 tablet 0     QUEtiapine (SEROQUEL) 100 MG tablet Takes 100 mg Q AM, and Noon.  Takes 400 mg Q HS - Total of 600 mg/day       traZODone (DESYREL) 100 MG tablet Take 100 mg by mouth       venlafaxine (EFFEXOR-XR) 75 MG 24 hr capsule Take 75 mg by mouth       vitamin D3 (CHOLECALCIFEROL) 1000 units (25 mcg) tablet Take 1 tablet (1,000 Units) by mouth daily 90 tablet 3     Allergies   Allergen Reactions     Chicken-Derived Products (Egg) GI Disturbance and Other (See Comments)     Does not tolerate them  Does not tolerate them  Does not tolerate them     Depakote [Valproic Acid] Other (See Comments)     Behavior - maniac     No Clinical Screening - See Comments      Seasonal     Seasonal Allergies      Seasonal     Sulfamethoxazole W/Trimethoprim      Heart burn, [place], shaky     Aspirin Rash     Exam Date Exam Time Accession # Results    4/18/19  3:49 PM [id number]    Component Value Flag Ref Range Units Status Collected Lab   Phencyclidine NEGATIVE   -ATIVE  Final 04/18/2019  3:49 PM SM   Propoxyphene NEGATIVE   -ATIVE  Final 04/18/2019  3:49 PM SM   Tricyclic Antidepressants POSITIVE  Abnormal   -ATIVE  Final 04/18/2019  3:49 PM SM   Amphetamines Qual NEGATIVE   -ATIVE  Final 04/18/2019  3:49 PM SM   Barbiturates Qual Urine NEGATIVE   -ATIVE  Final 04/18/2019  3:49 PM SM   Buprenorphine Qual Urine NEGATIVE   -ATIVE  Final 04/18/2019  3:49 PM SM   Benzodiazepine Qual Urine NEGATIVE   -ATIVE  Final 04/18/2019  3:49 PM SM   Cocaine Qual Urine POSITIVE  Abnormal   -ATIVE  Final 04/18/2019  3:49 PM SM   Cannabinoids Qual Urine NEGATIVE   -ATIVE  Final 04/18/2019  3:49 PM SM   Methamphetamine Qual NEGATIVE    -ATIVE  Final 04/18/2019  3:49 PM SM   Methadone Qual NEGATIVE   -ATIVE  Final 04/18/2019  3:49 PM SM   Morphine Qual NEGATIVE   -ATIVE  Final 04/18/2019  3:49 PM SM   Oxycodone Qual NEGATIVE   -ATIVE  Final 04/18/2019  3:49 PM SM   Temperature of Urine was Between  Degrees F YES   YES  Final 04/18/2019  3:49 PM SM       Environmental factors that may impact patient: none.There were no vitals taken for this visit.    Drug Therapy Assessment:  Drug therapy problems identified:     1. Depression/Anxiety    Paul is a 45-year-old male presenting to clinic today for hospital follow-up.  He recently relapsed on cocaine and was admitted to the hospital.  Today he requests refills on all of his medications due to not having access to some of them.  Additionally he asked for an allergy medicine that will make him drowsy because he is really tired already with all of his medications he is taking.  Suggested he try taking over-the-counter loratadine which should not be fatiguing.    I discussed ordering refills with Dr. Mena.  We both did not feel comfortable ordering refills for all of his medications due to recent changes by psych.  We recommended instead that he makes an appointment with his psychiatrist in the near future to receive refills.    Additionally he had asked about the concussion clinic and not being contacted by them.  I reviewed his phone numbers because there was multiple messages left for him via the notes in epic.  His phone number was incorrect and his profile so we updated it to the correct phone numbers.    All medications were reviewed and found to be indicated, effective, safe and convenient unless drug therapy problem identified as described above.        Drug Therapy Plan and Follow up:    Plan  -Refills were ordered for all medications except those authorized by psych  -Follow-up with psych  -Follow with concussion clinic if possible      Follow up: with PCP as directed   Patient was  provided with written instructions/medication list via AVS        Options for treatment and/or follow-up care were reviewed with the patient. Patient was engaged and actively involved in the decision making process.  Paul Godfrey verbalized understanding of the options discussed and was satisfied with the final plan.      Dr. Mena was provided my action  in clinic today and Dr. Watkins was available for supervision during this visit and is the authorizing prescriber for this visit through the pharmacist collaborative practice agreement.      Geovanna Arteaga, Pharm.D          Total Time: 20  # DTPs Identified: 1    Medical Condition 1: Allergies, Goals of therapy: Not at goal, Drug Class: Antihistamine, Indication: Needs additional drug therapy,  ,  ,  , Intervention: Initiate drug, Verification: Recommendation to Provider   ,  ,  ,  ,  ,  ,  ,  ,     ,  ,  ,  ,  ,  ,  ,  ,     ,  ,  ,

## 2024-02-07 NOTE — PROGRESS NOTES
[facility] Virtual The virtual visit conducted with Audio and Video.    Verbal consent was given for the following virtual visit, patient is currently located in [place]. All issues discussed and addressed below were done so without a physical examination. If it was felt the patient needed be seen in clinic in person they were directed there.     Subjective   COVID-19 Infection Date:  12/21/2021 (tested positive still on 1/7/22) (sx: cough, fatigue, headache, runny nose, confusion, brain fog - no treatment or hospitalization)    COVID-19 vaccine status: Pfizer 2/17/21, 3/8/21, 10/25/21    Occupation: full-time supervisor at [employer]     Current Providers: PCP Dr. Reyes Reeves, Sleep Medicine Dr. Messina, Psychiatry Dr. Maynard, Neuropsychology Dr. Arreola, Psychology Beatriz Dumont    Survey scores: 08/2022 -> 01/2023 -> 07/2023 -> 01/2024  PHQ-9: 17 -> 21 -> 13 -> 18   MATHIEU-7: 10 -> 21 -> 11 -> 14   Sleep Wellness: 11 snores -> 14 -> 13 -> 12   FSS average: 6.444 -> 5.889 -> 6 -> 6.33   Modified ECog average: 2.25 -> 2.833 -> 1.667 -> 1.75   MOCA: 19/22 blind version (08/2022) -> 20/22 (08/2023)  Overall Health: 55 -> 66 -> 55 -> 51 -> 50 -> 60    38 y.o. male with h/o COVID-19 in December 2021, BRCA gene mutation, obesity, GERD, DM2, presents for follow-up at [facility]  [facility] with c/o fatigue, cognitive changes, anxiety and depression.    In the office today, has been trying to do 1 day every 2 weeks, did 1 day per month prior and the day after he is wiped out  Taking rest times when at work, still crashing the next day to varying degrees  New medication prescribed by Dr. Messina is helping, used to take 3 rest periods during the day and was able to cut that down to 2  Still doing OK with CPAP, sleeping OK, sometimes falls asleep before putting on the CPAP  Cognitive changes ebb and flow, still happens that he searches for words and names more than normal  Yesterday suddenly couldn't think of what to say  next in a conversation  Feels that cognitive changes are not as severe as it was, moderate improvement  Was focused on establishing with a therapist before OT, was not successful because provider who he was referred to went on an unexpected long term leave, nobody is accepting new patients  Found a couples therapist for him and his wife, very nice, will continue that  Considering seeing him on his own  Not feeling well in regards to mood, hides this well    Relevant prior healthcare visits:  -04/2023 Sleep Medicine recommends increasing time on CPAP, if no improvement in fatigue consider stimulant, weight loss recommended  -04/2023 PCP increased sertraline dose, mounjaro and metformin for DM2 and weight loss  -07/2023 Neuropsychology Rehab most recent session  -07/2023 PCP notes headaches appear to be migraines, prescribed trial of Imitrex  -08/2023 Psychology session #6  -01/2024 Sleep Medicine recommends continued PAP use with all sleep, stopping solriamfetol for EDS/fatigue due to ineffectiveness, transition to modafinil  -01/2024 Psychiatry for persistent depressive disorder with anxious distress related to placing his needs last leading to work and family stress, intermittent explosive anger, addiction-like behavior leads to brief pleasure followed by worsening depression, improving, continued sertraline and recommended therapy    Relevant prior diagnostic studies:  -08/2022 HSAT shows severe BRANDEN with O2 cole 78.1%    Relevant prior laboratory values, unremarkable unless noted:  -07/2022 CBC/D, Vitamin B12 1010, TSH, CMP   -10/2022 HbA1c 7.8%  -11/2022 Vitamin B1 245, Vitamin B6 374, Vitamin B2, SPEP, Tryptase, Respiratory Allergy Profile, CYNTHIA positive, QUINTON normal, Citrulline Ab, BNP, HBA1c 7.6%, ESR 33, RF, Lipids, Mag, Iron studies, Ferritin, CMP, CRP, CK, CBC/D, TSH, Vitamin B12 1057, Folate, Vitamin D 30, AM cortisol, Tropinin, D-Dimer, Coags  -03/2023 Vitamin B6 319, Vitamin D 54, Vitamin B12  1125  -04/2023 CMP, Lipids elevated  -05/2023 Vitamin B6 286, Vitamin D 66, Vitamin B12 1254  -07/2023 HbA1c, CBC/D with Hb 12.4, CMP, Uric Acid  -09/2023 Vitamin B6 293, Lipids, Vitamin D, Vitamin B12 1250, Retics, iron studies, ferritin, CBC/D    Exercise routine: 10 minutes twice per week  Diet:  Weight hx: pre-COVID-19 250 lbs -> post-COVID-19 265 lbs -> 250lbs -> 255lbs -> 255lbs -> 252lbs -> 240 lbs  Substance use: 1-2 servings ETOH 2-3 times per week  Social: , two girls      Current Outpatient Medications:     acetaminophen (Tylenol) 500 mg tablet, Take by mouth., Disp: , Rfl:     armodafinil (Nuvigil) 250 mg tablet, Take 1 tablet (250 mg) by mouth once daily., Disp: 30 tablet, Rfl: 1    BACILLUS COAGULANS-INULIN ORAL, Take by mouth., Disp: , Rfl:     blood sugar diagnostic (Advanced Gluc Meter Test Strip) strip, Use to test two times daily, Disp: , Rfl:     ferrous gluconate 236 mg (27 mg iron) tablet, Take by mouth., Disp: , Rfl:     metFORMIN  mg 24 hr tablet, TAKE 2 TABLETS BY MOUTH TWICE A DAY, Disp: 360 tablet, Rfl: 3    multivitamin capsule, Take by mouth., Disp: , Rfl:     naproxen sodium (Aleve) 220 mg tablet, Take 1 tablet (220 mg) by mouth every 12 hours if needed for mild pain (1 - 3)., Disp: , Rfl:     omega 3-dha-epa-fish oil (Fish OiL) 1,000 mg (120 mg-180 mg) capsule, Take 1 capsule (1,000 mg) by mouth 2 times a day., Disp: , Rfl:     sertraline (Zoloft) 50 mg tablet, Take 3 tablets (150 mg) by mouth once daily., Disp: 90 tablet, Rfl: 5    SUMAtriptan (Imitrex) 50 mg tablet, Take 1 tablet (50 mg) by mouth 1 time if needed for migraine. May repeat after 2 hours. Max dose is 200mg/24 hours, Disp: 9 tablet, Rfl: 5    tirzepatide (Mounjaro) 10 mg/0.5 mL pen injector, Inject 10 mg under the skin 1 (one) time per week., Disp: 2 mL, Rfl: 11    Past Medical History:   Diagnosis Date    Abnormal findings on diagnostic imaging of other specified body structures     Abnormal finding on  chest xray    Anesthesia of skin 05/29/2018    Left facial numbness    BRCA gene mutation positive in male 04/24/2023    had genetics consultation, no other screenings recommended    Chronic back pain 10/16/2013    COVID-19 long hauler manifesting chronic fatigue 04/24/2023    Ectopic kidney 04/24/2023    Microcytic anemia 04/24/2023    Personal history of diseases of the blood and blood-forming organs and certain disorders involving the immune mechanism     History of anemia    Personal history of other (healed) physical injury and trauma 08/04/2015    History of back injury    Personal history of other diseases of the digestive system     History of gastroesophageal reflux (GERD)    Personal history of other specified conditions     History of heartburn    Plantar wart of both feet     History of onychomycosis and recurrent wart on his foot, has gone to a dermatologist received bleomycin injections as a last resort and still with recurrence. Currently under treatment for both issues with podiatrist Richie Pauilno.    Premature ejaculation 04/24/2023    Spina bifida occulta 04/24/2023    Type 2 diabetes mellitus with hyperglycemia, without long-term current use of insulin (CMS/[facility]) 04/24/2023       Past Surgical History:   Procedure Laterality Date    REFRACTIVE SURGERY  08/04/2015    Corneal LASIK       Family History   Problem Relation Name Age of Onset    Diabetes Mother      Colon polyps Mother      Obesity Mother      Obesity Father      Breast cancer Mother's Sister      Colon cancer Mother's Brother  60    Diabetes Mother's Brother      Colon cancer Maternal Grandmother  60    Myasthenia gravis Maternal Grandmother      Pancreatic cancer Maternal Grandfather      Anesthesia problems Paternal Grandfather      Deep vein thrombosis Paternal Grandfather         Objective   There were no vitals taken for this visit.    Physical Exam  Constitutional:       Comments: no acute distress, alert/conversational,  appropriate affect, no focal neurological deficits noted via video appointment          Assessment/Plan   Problem List Items Addressed This Visit             ICD-10-CM       High    Post-acute sequelae of COVID-19 (PASC) - Primary U09.9     02/2024:   fatigue, cognitive changes, anxiety and depression  -follow-up on referral to Occupational Therapist Kim Morales for cognitive rehabilitation and fatigue management, please call [phone] to set up a virtual or in-person appointment at [facility]   -continue close follow-up with sleep medicine, Psychiatry, and Psychology  -obtain Vitamin B6 repeat lab draw   -plan to extend work accommodations through July 2024 11/2023:   fatigue, cognitive changes, anxiety and depression  -stop Vitamin B6 supplementation, we will recheck your level at time of follow-up  -continue close follow-up with your specialists and their recommendations  -referral to Occupational Therapist Radhika Morales, let me know if you have any difficulty scheduling this  -extending work accommodations through 2/29 08/2023:  fatigue, cognitive changes, anxiety and depression  -recheck Vitamin B6, B12, and D levels  -continue close follow-up with your specialists and their recommendations  -continue to wear CPAP  -> stop B6    05/2023  fatigue, cognitive changes, anxiety and depression  -recheck Vitamin B6, B12, and D levels  -referral Psychiatry Dr. Maynard  -referral to [facility]  -follow-up with Neuropsychology Rehab and Sleep Medicine as scheduled  -continue to wear CPAP  Stop B6, decrease B12, D once daily    02/2023: fatigue, cognitive changes, sinus congestion, anxiety and depression  -restart nose sprays for sinus congestion. Take Flonase for 1-2 weeks and if not having sufficient improvement add Azelastine  -repeat check Vitamins B6, B12, D   -wear CPAP during naps as well  -if you do not feel improved when back on Sertraline, I recommend evaluation by Psychiatry  -glad [facility], please let me know if you need a referral to our immediately available IOP option   -if no improvement in cognitive function and fatigue level in 1-2 months I recommend we refer you to Neuropsychology Rehabilitation  .brain  Stop B6, decrease B12, D 2K daily    11/2022: Fatigue, cognitive changes, sinus congestion, intermittent constipation and diarrhea, mood changes  -follow-up with blood work orders, please have this drawn in the morning and fasting  .juanito  -follow-up with therapist to address mood changes as scheduled  .fog  -try Azelastine nose spray to help with congestion  -to schedule with [facility] call [phone]  .ibs  .brain  Stop B1 and B6, decrease B12, ? D supplement dose    08/2022:  Fatigue, cognitive changes, sinus congestion, intermittent constipation, mood changes  .blood  .hsat  .fog  .con  -for intermittent constipation, try taking Miralax or a Magnesium supplement with your iron supplement. Magnesium Glycinate causes less GI side effects, Magnesium Oxide and Citrate can help with GI motility  -Flonase  .brain

## 2024-02-12 ENCOUNTER — TELEMEDICINE (OUTPATIENT)
Dept: OTHER | Facility: CLINIC | Age: 39
End: 2024-02-12
Payer: COMMERCIAL

## 2024-02-12 DIAGNOSIS — U09.9 POST-ACUTE SEQUELAE OF COVID-19 (PASC): Primary | ICD-10-CM

## 2024-02-12 PROCEDURE — 1036F TOBACCO NON-USER: CPT | Performed by: NURSE PRACTITIONER

## 2024-02-12 PROCEDURE — 99213 OFFICE O/P EST LOW 20 MIN: CPT | Performed by: NURSE PRACTITIONER

## 2024-02-12 PROCEDURE — 99213 OFFICE O/P EST LOW 20 MIN: CPT | Mod: ZK,95 | Performed by: NURSE PRACTITIONER

## 2024-02-12 PROCEDURE — 3008F BODY MASS INDEX DOCD: CPT | Performed by: NURSE PRACTITIONER

## 2024-02-12 NOTE — PATIENT INSTRUCTIONS
It was my pleasure seeing you in the [facility] today.  We will focus on addressing the following symptoms discussed today: fatigue, cognitive changes, anxiety and depression    My recommendations are as follows:  -follow-up on referral to Occupational Therapist Kim Morales for cognitive rehabilitation and fatigue management, please call [phone] to set up a virtual [facility]   -continue close follow-up with sleep medicine, Psychiatry, and Psychology  -obtain Vitamin B6 repeat lab draw   -plan to extend work accommodations through July 2024    Please return to [facility] in July, call [phone] or send a message through your [employer] leslie if needed.    Please also consider attending  [facility] support group for long-COVID and post-ICU patients. To contact support group, email [email or web address] or call [phone]

## 2024-02-13 ENCOUNTER — OFFICE VISIT (OUTPATIENT)
Dept: PRIMARY CARE | Facility: CLINIC | Age: 39
End: 2024-02-13
Payer: COMMERCIAL

## 2024-02-13 VITALS
BODY MASS INDEX: 34.22 KG/M2 | HEIGHT: 70 IN | HEART RATE: 86 BPM | TEMPERATURE: 97.5 F | OXYGEN SATURATION: 97 % | DIASTOLIC BLOOD PRESSURE: 72 MMHG | SYSTOLIC BLOOD PRESSURE: 119 MMHG | WEIGHT: 239 LBS

## 2024-02-13 DIAGNOSIS — E78.2 COMBINED HYPERLIPIDEMIA: ICD-10-CM

## 2024-02-13 DIAGNOSIS — R80.9 TYPE 2 DIABETES MELLITUS WITH MICROALBUMINURIA, WITHOUT LONG-TERM CURRENT USE OF INSULIN (MULTI): ICD-10-CM

## 2024-02-13 DIAGNOSIS — E11.29 TYPE 2 DIABETES MELLITUS WITH MICROALBUMINURIA, WITHOUT LONG-TERM CURRENT USE OF INSULIN (MULTI): ICD-10-CM

## 2024-02-13 DIAGNOSIS — E66.09 CLASS 1 OBESITY DUE TO EXCESS CALORIES WITH SERIOUS COMORBIDITY AND BODY MASS INDEX (BMI) OF 34.0 TO 34.9 IN ADULT: Primary | ICD-10-CM

## 2024-02-13 DIAGNOSIS — E11.65 TYPE 2 DIABETES MELLITUS WITH HYPERGLYCEMIA (MULTI): ICD-10-CM

## 2024-02-13 LAB
ERYTHROCYTE [DISTWIDTH] IN BLOOD BY AUTOMATED COUNT: 15.3 % (ref 11.5–14.5)
EST. AVERAGE GLUCOSE BLD GHB EST-MCNC: 137 MG/DL
HBA1C MFR BLD: 6.4 %
HCT VFR BLD AUTO: 46.4 % (ref 41–52)
HGB BLD-MCNC: 14.5 G/DL (ref 13.5–17.5)
MCH RBC QN AUTO: 25.7 PG (ref 26–34)
MCHC RBC AUTO-ENTMCNC: 31.3 G/DL (ref 32–36)
MCV RBC AUTO: 82 FL (ref 80–100)
NRBC BLD-RTO: 0 /100 WBCS (ref 0–0)
PLATELET # BLD AUTO: 281 X10*3/UL (ref 150–450)
RBC # BLD AUTO: 5.65 X10*6/UL (ref 4.5–5.9)
WBC # BLD AUTO: 7.2 X10*3/UL (ref 4.4–11.3)

## 2024-02-13 PROCEDURE — 36415 COLL VENOUS BLD VENIPUNCTURE: CPT

## 2024-02-13 PROCEDURE — 3078F DIAST BP <80 MM HG: CPT | Performed by: INTERNAL MEDICINE

## 2024-02-13 PROCEDURE — 99214 OFFICE O/P EST MOD 30 MIN: CPT | Performed by: INTERNAL MEDICINE

## 2024-02-13 PROCEDURE — 85027 COMPLETE CBC AUTOMATED: CPT

## 2024-02-13 PROCEDURE — 83036 HEMOGLOBIN GLYCOSYLATED A1C: CPT

## 2024-02-13 PROCEDURE — 80053 COMPREHEN METABOLIC PANEL: CPT

## 2024-02-13 PROCEDURE — 3074F SYST BP LT 130 MM HG: CPT | Performed by: INTERNAL MEDICINE

## 2024-02-13 PROCEDURE — 3008F BODY MASS INDEX DOCD: CPT | Performed by: INTERNAL MEDICINE

## 2024-02-13 PROCEDURE — 1036F TOBACCO NON-USER: CPT | Performed by: INTERNAL MEDICINE

## 2024-02-13 PROCEDURE — 80061 LIPID PANEL: CPT

## 2024-02-13 RX ORDER — METFORMIN HYDROCHLORIDE 500 MG/1
500 TABLET, EXTENDED RELEASE ORAL
Qty: 180 TABLET | Refills: 3 | Status: SHIPPED | OUTPATIENT
Start: 2024-02-13 | End: 2024-04-01 | Stop reason: SDUPTHER

## 2024-02-13 NOTE — PROGRESS NOTES
"Subjective   Jason Claude is a 38 y.o. male who presents for Follow-up.    PMH significant for long-COVID, migraines, anxiety/depression, diabetes, obesity and hypertriglyceridemia.    Interim:  - Dermatology, cyst excision, doing fine  - COVID recovery clinic, most recently 2/13/2024  - Psychiatry, Dr. Maynard, 1/24/2024  - Sleep, 1/3/2024, Dr. Messina    Doing well overall, no concerns today.  Weight improved.  Trying to get back to regular exercise.      2 children, 8-year-old Elsa and 3 year-old Kayla. Things are going well at home, some stress with work, supervisor at [employer].    , ordained rabbi, works from home, government position, full time, history of [employer]  service with chronic pain due to back injury. Rare alcohol, no smoking.     Objective   /72   Pulse 86   Temp 36.4 °C (97.5 °F)   Ht 1.778 m (5' 10\")   Wt 108 kg (239 lb)   SpO2 97%   BMI 34.29 kg/m²      Physical Exam  Gen: NAD, pleasant, A&Ox3, central adiposity  HEENT: PERRL, EOMI, MMM, OP clear  Neck: supple, no thyromegaly, no JVD, normal carotid upstroke  Pulm: lungs CTAB, good air movement  CV: RRR, no m/r/g, 2+ DP pulses  Abd: NABS, soft, NT, ND no HSM  : no perirectal disease or abnormality  Ext: no peripheral edema  Neuro: CN II-XII intact, no focal sensory or motor deficits, normal reflexes    Assessment/Plan   Mood disorder/PE:   - treated by psychiatry, doing okay on current  -Continue sertraline 150 mg  -[facility]    Severe BRANDEN: on CPAP, has gotten more accustomed to it but has not noticed any benefit in terms of fatigue; however sleep quality on his downloads shows significant improvement in events; using at least 6 hours per night, continue; f/u with sleep medicine  (Dr. Messina), started on Nuvigil    T2DM:   significant improvement with A1c 5.5%  - Continue metformin 500mg BID  - continue Mounjaro, currently on 10mg  - work on improving  diet and exercise  - " recommend statin and ACE/ARB, wants to avoid    Hypertriglyceridemia: Related to insulin resistance and high serum blood glucose, rechecked in 9/5/2023, continue fish oil, CTM    Migraines: without aura, continue prn treatment for now; sumatriptan helps; currently 1-2x/month        Health maintenance  -Last colonoscopy: 2 second-degree relatives colon cancer, mother had polyps, suggested early screening at age 40  -Smoking history: Never  -Counseled regarding diet and exercise  -Immunizations: annual influenza  -Followup for [facility]V  Problem List Items Addressed This Visit    None         Reyes Reeves MD

## 2024-02-14 LAB
ALBUMIN SERPL BCP-MCNC: 4.9 G/DL (ref 3.4–5)
ALP SERPL-CCNC: 89 U/L (ref 33–120)
ALT SERPL W P-5'-P-CCNC: 27 U/L (ref 10–52)
ANION GAP SERPL CALC-SCNC: 16 MMOL/L (ref 10–20)
AST SERPL W P-5'-P-CCNC: 22 U/L (ref 9–39)
BILIRUB SERPL-MCNC: 0.4 MG/DL (ref 0–1.2)
BUN SERPL-MCNC: 14 MG/DL (ref 6–23)
CALCIUM SERPL-MCNC: 10.5 MG/DL (ref 8.6–10.6)
CHLORIDE SERPL-SCNC: 99 MMOL/L (ref 98–107)
CHOLEST SERPL-MCNC: 202 MG/DL (ref 0–199)
CHOLESTEROL/HDL RATIO: 6.1
CO2 SERPL-SCNC: 29 MMOL/L (ref 21–32)
CREAT SERPL-MCNC: 0.88 MG/DL (ref 0.5–1.3)
EGFRCR SERPLBLD CKD-EPI 2021: >90 ML/MIN/1.73M*2
GLUCOSE SERPL-MCNC: 106 MG/DL (ref 74–99)
HDLC SERPL-MCNC: 33.2 MG/DL
LDLC SERPL CALC-MCNC: 89 MG/DL
NON HDL CHOLESTEROL: 169 MG/DL (ref 0–149)
POTASSIUM SERPL-SCNC: 4.7 MMOL/L (ref 3.5–5.3)
PROT SERPL-MCNC: 7.9 G/DL (ref 6.4–8.2)
SODIUM SERPL-SCNC: 139 MMOL/L (ref 136–145)
TRIGL SERPL-MCNC: 397 MG/DL (ref 0–149)
VLDL: 79 MG/DL (ref 0–40)

## 2024-03-18 ENCOUNTER — PATIENT MESSAGE (OUTPATIENT)
Dept: PRIMARY CARE | Facility: CLINIC | Age: 39
End: 2024-03-18
Payer: COMMERCIAL

## 2024-03-18 DIAGNOSIS — E11.29 TYPE 2 DIABETES MELLITUS WITH MICROALBUMINURIA, WITHOUT LONG-TERM CURRENT USE OF INSULIN (MULTI): ICD-10-CM

## 2024-03-18 DIAGNOSIS — E66.09 CLASS 1 OBESITY DUE TO EXCESS CALORIES WITH SERIOUS COMORBIDITY AND BODY MASS INDEX (BMI) OF 34.0 TO 34.9 IN ADULT: ICD-10-CM

## 2024-03-18 DIAGNOSIS — R80.9 TYPE 2 DIABETES MELLITUS WITH MICROALBUMINURIA, WITHOUT LONG-TERM CURRENT USE OF INSULIN (MULTI): ICD-10-CM

## 2024-03-18 DIAGNOSIS — E11.65 TYPE 2 DIABETES MELLITUS WITH HYPERGLYCEMIA, WITHOUT LONG-TERM CURRENT USE OF INSULIN (MULTI): Primary | ICD-10-CM

## 2024-03-18 RX ORDER — TIRZEPATIDE 12.5 MG/.5ML
12.5 INJECTION, SOLUTION SUBCUTANEOUS
Qty: 2 ML | Refills: 3 | Status: SHIPPED | OUTPATIENT
Start: 2024-03-18 | End: 2024-03-19 | Stop reason: RX

## 2024-03-19 RX ORDER — TIRZEPATIDE 7.5 MG/.5ML
7.5 INJECTION, SOLUTION SUBCUTANEOUS
Qty: 2 ML | Refills: 1 | Status: SHIPPED | OUTPATIENT
Start: 2024-03-19 | End: 2024-03-19 | Stop reason: RX

## 2024-03-19 RX ORDER — TIRZEPATIDE 10 MG/.5ML
10 INJECTION, SOLUTION SUBCUTANEOUS
Qty: 2 ML | Refills: 11 | Status: SHIPPED | OUTPATIENT
Start: 2024-03-19 | End: 2024-06-06 | Stop reason: SDUPTHER

## 2024-03-20 ENCOUNTER — APPOINTMENT (OUTPATIENT)
Dept: SLEEP MEDICINE | Facility: CLINIC | Age: 39
End: 2024-03-20
Payer: COMMERCIAL

## 2024-03-26 DIAGNOSIS — G47.10 HYPERSOMNIA: ICD-10-CM

## 2024-03-26 DIAGNOSIS — G47.33 OSA (OBSTRUCTIVE SLEEP APNEA): ICD-10-CM

## 2024-03-27 RX ORDER — ARMODAFINIL 250 MG/1
250 TABLET ORAL DAILY
Qty: 30 TABLET | Refills: 0 | Status: SHIPPED | OUTPATIENT
Start: 2024-03-27

## 2024-04-01 ENCOUNTER — PATIENT MESSAGE (OUTPATIENT)
Dept: PRIMARY CARE | Facility: CLINIC | Age: 39
End: 2024-04-01
Payer: COMMERCIAL

## 2024-04-01 DIAGNOSIS — G43.009 MIGRAINE WITHOUT AURA AND WITHOUT STATUS MIGRAINOSUS, NOT INTRACTABLE: Primary | ICD-10-CM

## 2024-04-01 DIAGNOSIS — F34.1 PERSISTENT DEPRESSIVE DISORDER WITH ANXIOUS DISTRESS, CURRENTLY SEVERE: ICD-10-CM

## 2024-04-01 DIAGNOSIS — E11.65 TYPE 2 DIABETES MELLITUS WITH HYPERGLYCEMIA (MULTI): ICD-10-CM

## 2024-04-01 RX ORDER — RIZATRIPTAN BENZOATE 5 MG/1
5 TABLET ORAL ONCE AS NEEDED
Qty: 9 TABLET | Refills: 3 | Status: SHIPPED | OUTPATIENT
Start: 2024-04-01 | End: 2024-04-04

## 2024-04-01 RX ORDER — METFORMIN HYDROCHLORIDE 500 MG/1
500 TABLET, EXTENDED RELEASE ORAL
Qty: 180 TABLET | Refills: 3 | Status: SHIPPED | OUTPATIENT
Start: 2024-04-01 | End: 2025-04-01

## 2024-04-01 RX ORDER — SERTRALINE HYDROCHLORIDE 50 MG/1
150 TABLET, FILM COATED ORAL DAILY
Qty: 90 TABLET | Refills: 5 | Status: SHIPPED | OUTPATIENT
Start: 2024-04-01 | End: 2024-04-15

## 2024-04-04 DIAGNOSIS — G43.009 MIGRAINE WITHOUT AURA AND WITHOUT STATUS MIGRAINOSUS, NOT INTRACTABLE: ICD-10-CM

## 2024-04-04 RX ORDER — RIZATRIPTAN BENZOATE 5 MG/1
5 TABLET, ORALLY DISINTEGRATING ORAL ONCE AS NEEDED
Qty: 9 TABLET | Refills: 0 | Status: SHIPPED | OUTPATIENT
Start: 2024-04-04 | End: 2024-04-05

## 2024-04-05 RX ORDER — RIZATRIPTAN BENZOATE 5 MG/1
TABLET, ORALLY DISINTEGRATING ORAL
Qty: 9 TABLET | Refills: 0 | Status: SHIPPED | OUTPATIENT
Start: 2024-04-05

## 2024-04-14 DIAGNOSIS — F34.1 PERSISTENT DEPRESSIVE DISORDER WITH ANXIOUS DISTRESS, CURRENTLY SEVERE: ICD-10-CM

## 2024-04-15 RX ORDER — SERTRALINE HYDROCHLORIDE 50 MG/1
150 TABLET, FILM COATED ORAL DAILY
Qty: 90 TABLET | Refills: 5 | Status: SHIPPED | OUTPATIENT
Start: 2024-04-15 | End: 2024-10-12

## 2024-05-01 ENCOUNTER — APPOINTMENT (OUTPATIENT)
Dept: SLEEP MEDICINE | Facility: CLINIC | Age: 39
End: 2024-05-01
Payer: COMMERCIAL

## 2024-06-03 SDOH — ECONOMIC STABILITY: INCOME INSECURITY: IN THE LAST 12 MONTHS, WAS THERE A TIME WHEN YOU WERE NOT ABLE TO PAY THE MORTGAGE OR RENT ON TIME?: NO

## 2024-06-03 SDOH — ECONOMIC STABILITY: FOOD INSECURITY: WITHIN THE PAST 12 MONTHS, THE FOOD YOU BOUGHT JUST DIDN’T LAST AND YOU DIDN’T HAVE MONEY TO GET MORE.: NEVER TRUE

## 2024-06-03 SDOH — ECONOMIC STABILITY: FOOD INSECURITY: WITHIN THE PAST 12 MONTHS, THE FOOD YOU BOUGHT JUST DIDN'T LAST AND YOU DIDN'T HAVE MONEY TO GET MORE.: NEVER TRUE

## 2024-06-03 SDOH — ECONOMIC STABILITY: INCOME INSECURITY: HOW HARD IS IT FOR YOU TO PAY FOR THE VERY BASICS LIKE FOOD, HOUSING, MEDICAL CARE, AND HEATING?: NOT VERY HARD

## 2024-06-03 SDOH — ECONOMIC STABILITY: HOUSING INSECURITY: IN THE LAST 12 MONTHS, HOW MANY PLACES HAVE YOU LIVED?: 1

## 2024-06-03 SDOH — ECONOMIC STABILITY: FOOD INSECURITY: WITHIN THE PAST 12 MONTHS, YOU WORRIED THAT YOUR FOOD WOULD RUN OUT BEFORE YOU GOT MONEY TO BUY MORE.: NEVER TRUE

## 2024-06-03 SDOH — HEALTH STABILITY: PHYSICAL HEALTH: ON AVERAGE, HOW MANY DAYS PER WEEK DO YOU ENGAGE IN MODERATE TO STRENUOUS EXERCISE (LIKE A BRISK WALK)?: 1 DAY

## 2024-06-03 SDOH — HEALTH STABILITY: PHYSICAL HEALTH
HOW OFTEN DO YOU NEED TO HAVE SOMEONE HELP YOU WHEN YOU READ INSTRUCTIONS, PAMPHLETS, OR OTHER WRITTEN MATERIAL FROM YOUR DOCTOR OR PHARMACY?: NEVER

## 2024-06-03 SDOH — HEALTH STABILITY: PHYSICAL HEALTH: ON AVERAGE, HOW MANY MINUTES DO YOU ENGAGE IN EXERCISE AT THIS LEVEL?: 10 MIN

## 2024-06-03 ASSESSMENT — ANXIETY QUESTIONNAIRES
4. TROUBLE RELAXING: MORE THAN HALF THE DAYS
4. TROUBLE RELAXING: MORE THAN HALF THE DAYS
IF YOU CHECKED OFF ANY PROBLEMS ON THIS QUESTIONNAIRE, HOW DIFFICULT HAVE THESE PROBLEMS MADE IT FOR YOU TO DO YOUR WORK, TAKE CARE OF THINGS AT HOME, OR GET ALONG WITH OTHER PEOPLE: SOMEWHAT DIFFICULT
7. FEELING AFRAID AS IF SOMETHING AWFUL MIGHT HAPPEN: SEVERAL DAYS
3. WORRYING TOO MUCH ABOUT DIFFERENT THINGS: MORE THAN HALF THE DAYS
5. BEING SO RESTLESS THAT IT IS HARD TO SIT STILL: MORE THAN HALF THE DAYS
6. BECOMING EASILY ANNOYED OR IRRITABLE: MORE THAN HALF THE DAYS
2. NOT BEING ABLE TO STOP OR CONTROL WORRYING: MORE THAN HALF THE DAYS
IF YOU CHECKED OFF ANY PROBLEMS ON THIS QUESTIONNAIRE, HOW DIFFICULT HAVE THESE PROBLEMS MADE IT FOR YOU TO DO YOUR WORK, TAKE CARE OF THINGS AT HOME, OR GET ALONG WITH OTHER PEOPLE: SOMEWHAT DIFFICULT
2. NOT BEING ABLE TO STOP OR CONTROL WORRYING: MORE THAN HALF THE DAYS
6. BECOMING EASILY ANNOYED OR IRRITABLE: MORE THAN HALF THE DAYS
GAD7 TOTAL SCORE: 13
3. WORRYING TOO MUCH ABOUT DIFFERENT THINGS: MORE THAN HALF THE DAYS
5. BEING SO RESTLESS THAT IT IS HARD TO SIT STILL: MORE THAN HALF THE DAYS
7. FEELING AFRAID AS IF SOMETHING AWFUL MIGHT HAPPEN: SEVERAL DAYS
1. FEELING NERVOUS, ANXIOUS, OR ON EDGE: MORE THAN HALF THE DAYS
1. FEELING NERVOUS, ANXIOUS, OR ON EDGE: MORE THAN HALF THE DAYS

## 2024-06-03 ASSESSMENT — PATIENT HEALTH QUESTIONNAIRE - PHQ9
4. FEELING TIRED OR HAVING LITTLE ENERGY: NEARLY EVERY DAY
SUM OF ALL RESPONSES TO PHQ9 QUESTIONS 1 & 2: 4
1. LITTLE INTEREST OR PLEASURE IN DOING THINGS: MORE THAN HALF THE DAYS
2. FEELING DOWN, DEPRESSED OR HOPELESS: MORE THAN HALF THE DAYS
7. TROUBLE CONCENTRATING ON THINGS, SUCH AS READING THE NEWSPAPER OR WATCHING TELEVISION: MORE THAN HALF THE DAYS
SUM OF ALL RESPONSES TO PHQ QUESTIONS 1-9: 15
6. FEELING BAD ABOUT YOURSELF - OR THAT YOU ARE A FAILURE OR HAVE LET YOURSELF OR YOUR FAMILY DOWN: MORE THAN HALF THE DAYS
2. FEELING DOWN, DEPRESSED OR HOPELESS: MORE THAN HALF THE DAYS
10. IF YOU CHECKED OFF ANY PROBLEMS, HOW DIFFICULT HAVE THESE PROBLEMS MADE IT FOR YOU TO DO YOUR WORK, TAKE CARE OF THINGS AT HOME, OR GET ALONG WITH OTHER PEOPLE: SOMEWHAT DIFFICULT
3. TROUBLE FALLING OR STAYING ASLEEP: SEVERAL DAYS
9. THOUGHTS THAT YOU WOULD BE BETTER OFF DEAD, OR OF HURTING YOURSELF: NOT AT ALL
7. TROUBLE CONCENTRATING ON THINGS, SUCH AS READING THE NEWSPAPER OR WATCHING TELEVISION: MORE THAN HALF THE DAYS
8. MOVING OR SPEAKING SO SLOWLY THAT OTHER PEOPLE COULD HAVE NOTICED. OR THE OPPOSITE - BEING SO FIDGETY OR RESTLESS THAT YOU HAVE BEEN MOVING AROUND A LOT MORE THAN USUAL: NOT AT ALL
1. LITTLE INTEREST OR PLEASURE IN DOING THINGS: MORE THAN HALF THE DAYS
4. FEELING TIRED OR HAVING LITTLE ENERGY: NEARLY EVERY DAY
6. FEELING BAD ABOUT YOURSELF - OR THAT YOU ARE A FAILURE OR HAVE LET YOURSELF OR YOUR FAMILY DOWN: MORE THAN HALF THE DAYS
5. POOR APPETITE OR OVEREATING: NEARLY EVERY DAY
9. THOUGHTS THAT YOU WOULD BE BETTER OFF DEAD, OR OF HURTING YOURSELF: NOT AT ALL
8. MOVING OR SPEAKING SO SLOWLY THAT OTHER PEOPLE COULD HAVE NOTICED. OR THE OPPOSITE, BEING SO FIGETY OR RESTLESS THAT YOU HAVE BEEN MOVING AROUND A LOT MORE THAN USUAL: NOT AT ALL
3. TROUBLE FALLING OR STAYING ASLEEP OR SLEEPING TOO MUCH: SEVERAL DAYS
5. POOR APPETITE OR OVEREATING: NEARLY EVERY DAY
10. IF YOU CHECKED OFF ANY PROBLEMS, HOW DIFFICULT HAVE THESE PROBLEMS MADE IT FOR YOU TO DO YOUR WORK, TAKE CARE OF THINGS AT HOME, OR GET ALONG WITH OTHER PEOPLE: SOMEWHAT DIFFICULT

## 2024-06-03 ASSESSMENT — SLEEP AND FATIGUE QUESTIONNAIRES
MY MOTIVATION IS LOWER WHEN I AM FATIGUED.: 7 STRONGLY AGREE
MY FATIGUE PREVENTS SUSTAINED PHYSICAL FUNCTIONING.: 6
FATIGUE_INTERFERES_PHYSICAL_FUNCTIONING: 6
FATIGUE_CAUSES_FREQUENT_PROBLEMTS: 6
FATIGUE_INTERFERES_SOCIAL_LIFE: 7 STRONGLY AGREE
EASILY_FATIGUED: 7 STRONGLY AGREE
FATIGUE_INTERFERES_SOCIAL_LIFE: 7 STRONGLY AGREE
MY MOTIVATION IS LOWER WHEN I AM FATIGUED.: 7 STRONGLY AGREE
VISUAL ANALOGUE FATIGUE SCALE (VAFS): 4
FATIGUE_INTERFERES_RESPONSIBILITIES: 7 STRONGLY AGREE
MY FATIGUE PREVENTS SUSTAINED PHYSICAL FUNCTIONING.: 6
EXERCISE_BRINGS_ON_FATIGUE: 5
FATIGUE_INTERFERES_RESPONSIBILITIES: 7 STRONGLY AGREE
FATIGUE_CAUSES_FREQUENT_PROBLEMTS: 6
FATIGUE_MOST_DISABILING_SYMPTOM: 6
EXERCISE_BRINGS_ON_FATIGUE: 5
EASILY_FATIGUED: 7 STRONGLY AGREE
AVERAGE_FSS_SCORE: 6.33
FATIGUE_MOST_DISABILING_SYMPTOM: 6
FATIGUE_INTERFERES_PHYSICAL_FUNCTIONING: 6

## 2024-06-03 ASSESSMENT — SOCIAL DETERMINANTS OF HEALTH (SDOH)
HOW OFTEN DO YOU GET TOGETHER WITH FRIENDS OR RELATIVES?: ONCE A WEEK
HOW OFTEN DO YOU ATTEND CHURCH OR RELIGIOUS SERVICES?: MORE THAN 4 TIMES PER YEAR
HOW OFTEN DO YOU ATTEND CHURCH OR RELIGIOUS SERVICES?: MORE THAN 4 TIMES PER YEAR
DO YOU BELONG TO ANY CLUBS OR ORGANIZATIONS SUCH AS CHURCH GROUPS UNIONS, FRATERNAL OR ATHLETIC GROUPS, OR SCHOOL GROUPS?: YES
HOW OFTEN DO YOU GET TOGETHER WITH FRIENDS OR RELATIVES?: ONCE A WEEK
IN A TYPICAL WEEK, HOW MANY TIMES DO YOU TALK ON THE PHONE WITH FAMILY, FRIENDS, OR NEIGHBORS?: TWICE A WEEK
DO YOU BELONG TO ANY CLUBS OR ORGANIZATIONS SUCH AS CHURCH GROUPS UNIONS, FRATERNAL OR ATHLETIC GROUPS, OR SCHOOL GROUPS?: YES
IN THE PAST 12 MONTHS, HAS THE ELECTRIC, GAS, OIL, OR WATER COMPANY THREATENED TO SHUT OFF SERVICE IN YOUR HOME?: NO
IN A TYPICAL WEEK, HOW MANY TIMES DO YOU TALK ON THE PHONE WITH FAMILY, FRIENDS, OR NEIGHBORS?: TWICE A WEEK
HOW OFTEN DO YOU ATTENT MEETINGS OF THE CLUB OR ORGANIZATION YOU BELONG TO?: NEVER
HOW OFTEN DO YOU ATTENT MEETINGS OF THE CLUB OR ORGANIZATION YOU BELONG TO?: NEVER
IN THE PAST 12 MONTHS, HAS THE ELECTRIC, GAS, OIL, OR WATER COMPANY THREATENED TO SHUT OFF SERVICE IN YOUR HOME?: NO

## 2024-06-03 ASSESSMENT — LIFESTYLE VARIABLES
HOW OFTEN DO YOU HAVE A DRINK CONTAINING ALCOHOL: 2-3 TIMES A WEEK
HOW MANY STANDARD DRINKS CONTAINING ALCOHOL DO YOU HAVE ON A TYPICAL DAY: 1 OR 2
USE_ALCOHOL_TO_HELP_SLEEP: NO
SKIP TO QUESTIONS 9-10: 1
HOW OFTEN DO YOU HAVE A DRINK CONTAINING ALCOHOL: 2-3 TIMES A WEEK
AUDIT-C TOTAL SCORE: 3
HOW OFTEN DO YOU HAVE SIX OR MORE DRINKS ON ONE OCCASION: NEVER
HOW MANY STANDARD DRINKS CONTAINING ALCOHOL DO YOU HAVE ON A TYPICAL DAY: 1 OR 2
DO_YOU_DRINK?: NO DIFFERENCE
HOW OFTEN DO YOU HAVE 6 OR MORE DRINKS ON ONE OCCASION: NEVER

## 2024-06-04 DIAGNOSIS — R80.9 TYPE 2 DIABETES MELLITUS WITH MICROALBUMINURIA, WITHOUT LONG-TERM CURRENT USE OF INSULIN (MULTI): ICD-10-CM

## 2024-06-04 DIAGNOSIS — E11.29 TYPE 2 DIABETES MELLITUS WITH MICROALBUMINURIA, WITHOUT LONG-TERM CURRENT USE OF INSULIN (MULTI): ICD-10-CM

## 2024-06-06 RX ORDER — TIRZEPATIDE 10 MG/.5ML
10 INJECTION, SOLUTION SUBCUTANEOUS
Qty: 4 ML | Refills: 5 | Status: SHIPPED | OUTPATIENT
Start: 2024-06-09

## 2024-06-24 PROBLEM — F32.A DEPRESSION, UNSPECIFIED: Status: ACTIVE | Noted: 2023-05-24

## 2024-06-24 PROBLEM — E66.9 OBESITY DUE TO ENERGY IMBALANCE: Status: ACTIVE | Noted: 2023-09-05

## 2024-06-24 PROBLEM — U09.9 POST-ACUTE COVID-19 SYNDROME: Status: ACTIVE | Noted: 2024-06-24

## 2024-06-24 PROBLEM — F41.8 MIXED ANXIETY DEPRESSIVE DISORDER: Status: ACTIVE | Noted: 2023-04-24

## 2024-07-01 ENCOUNTER — APPOINTMENT (OUTPATIENT)
Dept: OTHER | Facility: CLINIC | Age: 39
End: 2024-07-01
Payer: COMMERCIAL

## 2024-07-15 DIAGNOSIS — G43.009 MIGRAINE WITHOUT AURA AND WITHOUT STATUS MIGRAINOSUS, NOT INTRACTABLE: ICD-10-CM

## 2024-07-16 RX ORDER — RIZATRIPTAN BENZOATE 5 MG/1
TABLET, ORALLY DISINTEGRATING ORAL
Qty: 9 TABLET | Refills: 0 | Status: SHIPPED | OUTPATIENT
Start: 2024-07-16

## 2024-07-16 NOTE — PROGRESS NOTES
Patient: Jason Claude    [id number]  : 1985 -- AGE 39 y.o.    Provider: Nati Messina MD PhD     Location [facility]   Service Date: 2024              [facility]  Followup Visit Note      HISTORY OF PRESENT ILLNESS     The patient's referring provider is: Reyes Reeves MD     HISTORY OF PRESENT ILLNESS   Jason Claude is a 39 y.o. male with past medical history significant for BRANDEN, T2DM, GERD, HLD, depression, history of COVID with sequela of fatigue, obesity who presents to a [facility] for followup.     PAST SLEEP HISTORY  Patient had been referred for HSAT on 22 at a BMI of 36. THe AHI3% was 58/h and the AHI4% was 43/h. The T88 was 19 min and the O2 cole was 78%. Seen 10/26/22 to get started on CPAP. Had COVID . He was then seen in the long-haul COVID since 2022. As part of an evaluation he was recommended to have sleep study. Precovid was getting 5-6h of sleep and doing ok. Post-COVID with 7-8h and feeling fatigue and brain fog. Sometime naps 1.5-2h if needs to at 8I37-3o. He is on APAP 5-15 to treat his sleep apnea.     For his residual sleepiness he was started on solriamfetol but he did not benefit at he 75mg dose. We trialled armodafinil as of 2024. He was increased to 250mg armodafinil    CURRENT HISTORY  At his last visit,  he was using CPAP with nasal pillows and was taking Sunosi but not noting benefits with taking it. Due to lack of benefit we switched him to armodafinil.    On today's visit, he relates that with the armodafinil he did feel a little better. He noted that he was a little fatigued and less sleepy. He ran out of the medication a few months ago.  With the armodafinil he ran out of the medication and noted that he felt worse after stopping.  Overall he estimates that there is about a 30% improvement in his fatigue and sleepiness with the medication.  He reports no side effects with  the medication such as nausea, headaches.    He is on tirzepatide. He has had issues at times getting the medication.  He has been on it for about 1 year. He estimates about a 35# weight loss.  He believes his peak weight was approximately 270 pounds.    Sleep schedule:    In bed: 10:30PM  Activities in bed:  Bedtime Activities: turns out the lights  Subjective sleep latency:  5-10m  Awakenings during night: 0-1  Length of awakenings: 0-15m  Final awakening time: 5:40am  Out of bed: alarm snooze x 5m x2 and then for shower  Overall estimate of total sleep time: 6-7h    Weekends: Awaken at 6:50am  Preferred sleeping position: sidelying  Typically sleeps with his wife.       PAP Adherence:  DURABLE MEDICAL EQUIPMENT COMPANY: MEDICAL [employer]ICE COMPANY  Machine: THERAPY: RESMED AIRSENSE 11 PRESSURE SETTINGS: 5 - 15 cm H2O  Mask: MASK TYPE: NASAL PILLOWS MASK      Daytime Symptoms  On awakening patient reports: wake unrefreshed    Daytime: During the day, he finds himself falling asleep by noon. At [facility] he has fallen asleep while kneeling.    ESS: 16  TACOS: 14  FOSQ: 19      REVIEW OF SYSTEMS     REVIEW OF SYSTEMS  Review of Systems   All other systems reviewed and are negative.        ALLERGIES AND MEDICATIONS     ALLERGIES  No Known Allergies    MEDICATIONS: He has a current medication list which includes the following prescription(s): acetaminophen - Take by mouth, bacillus coagulans/inulin - Take by mouth, advanced gluc meter test strip - Use to test two times daily, docosahexaenoic acid/epa - Take by mouth, lactobacillus acidophilus - Take by mouth, multivitamin - Take by mouth, metformin xr - Take 1 tablet (500 mg) by mouth 2 times a day with meals, naproxen sodium - Take 1 tablet (220 mg) by mouth every 12 hours if needed for mild pain (1 - 3), omega 3-dha-epa-fish oil - Take 1 capsule (1,000 mg) by mouth 2 times a day, omega-3 acid ethyl esters - Take by mouth, rizatriptan mlt - Dissolve 1 tablet by mouth once if  "needed for migraine. May repeat in 2 hours if unresolved. Do not exceed 30 mg in 24 hours, mounjaro - Inject 10 mg under the skin 1 (one) time per week, armodafinil - TAKE 1 TABLET BY MOUTH ONCE DAILY (Patient not taking: Reported on 7/17/2024), armodafinil - Take 1 tablet (250 mg) by mouth once daily for 180 doses, ferrous sulfate - Take by mouth, saccharomyces boulardii - Take by mouth, and sertraline - TAKE 3 TABLETS (150 MG) BY MOUTH ONCE DAILY.    PAST MEDICAL HISTORY : He has Type 2 diabetes mellitus with microalbuminuria (Multi); Severe obstructive sleep apnea; Combined hyperlipidemia; Migraines; Class 1 obesity due to excess calories with serious comorbidity and body mass index (BMI) of 34.0 to 34.9 in adult; Arthritis of spine; Cognitive changes; Fatigue; Hypersomnia; Insulin resistance; Intermittent constipation; Onychomycosis; Pes planus; Diabetes mellitus with microalbuminuria (Multi); Persistent depressive disorder with anxious distress, currently mild; Post-acute sequelae of COVID-19 (PASC); Depression, unspecified; Mixed anxiety depressive disorder; Post-acute COVID-19 syndrome; and Obesity due to energy imbalance on their problem list.    PAST SURGICAL HISTORY: He  has a past surgical history that includes Refractive surgery (08/04/2015).     FAMILY HISTORY: No changes since previous visit. Otherwise non-contributory as charted.     SOCIAL HISTORY  He  reports that he has never smoked. He has never used smokeless tobacco. No history on file for alcohol use and drug use. He was in the [employer] at one point; Now he works at [employer],       PHYSICAL EXAM     Physical Examination: /79 (BP Location: Right arm, Patient Position: Sitting, BP Cuff Size: Adult)   Pulse 95   Temp 36.8 °C (98.2 °F) (Temporal)   Ht 1.778 m (5' 10\")   Wt 114 kg (250 lb 6.4 oz)   BMI 35.93 kg/m²     PREVIOUS WEIGHTS:  Wt Readings from Last 3 Encounters:   07/17/24 114 kg (250 lb 6.4 oz)   02/13/24 108 kg (239 lb) "   01/03/24 110 kg (242 lb 12.8 oz)       General: The patient is a pleasant male, in no acute distress. HEENT: He has a  a modified Mallampati grade 3 airway with Numbers; 0-4 (with +): 1+ tonsils bilaterally. The soft palate was normal and the uvula was normal. The A/P diameter of the velopharynx was narrowed. The oropharynx was shallow in the A/P diameter. Lateral wall narrowing was present. Tongue ridging was notpresent. Erythema of the posterior pharynx was not present. Mucosal hypertrophy in the posterior oropharynx was not present. Retrognathia was not and micrognathia was not present. Neck: The neck was not enlarged. No JVP, bruits or lymphadenopathy was appreciated. Chest: Clear to auscultation. No wheezes, rales, or rhonchi. Cardiovascular: Regular rate and rhythm. No murmurs, gallops, or clicks. Abdomen: Soft, nontender, nondistended. Positive bowel sounds. Extremities: No clubbing, cyanosis, or edema is noted. Neurologic exam: Alert, oriented x3 and was grossly non-focal.      RESULTS/DATA     Iron (ug/dL)   Date Value   09/05/2023 63   11/29/2022 56     Iron Saturation (%)   Date Value   09/05/2023 15 (L)   11/29/2022 13 (L)     TIBC (ug/dL)   Date Value   09/05/2023 433   11/29/2022 448 (H)     Ferritin (ug/L)   Date Value   09/05/2023 48   11/29/2022 35       Bicarbonate (mmol/L)   Date Value   02/13/2024 29   07/12/2023 30   04/24/2023 26   11/29/2022 27       PAP Adherence  A PAP adherence download was obtained and data was reviewed personally today in clinic. (see scanned document in EPIC)        DIAGNOSES     Problem List and Orders  Diagnoses and all orders for this visit:  BRANDEN (obstructive sleep apnea)  -     armodafinil (Nuvigil) 250 mg tablet; Take 1 tablet (250 mg) by mouth once daily for 180 doses.  -     Follow Up In Adult Sleep Medicine; Future  Hypersomnia  -     armodafinil (Nuvigil) 250 mg tablet; Take 1 tablet (250 mg) by mouth once daily for 180 doses.  Class 1 obesity due to excess  "calories with serious comorbidity and body mass index (BMI) of 34.0 to 34.9 in adult  BMI 34.0-34.9,adult  Medication management  -     Drug Screen, Urine With Reflex to Confirmation; Future          ASSESSMENT/PLAN     Mr. Claude is a 39 y.o. male and with past medical history significant for BRANDEN, T2DM, GERD, HLD, depression, history of COVID with sequela of fatigue, obesity He returns in followup to  the [facility] for their BRANDEN.    BRANDEN on PAP: Overall, Mr. Claude is doing well with the use of PAP therapy for his BRANDEN. Compared to before starting PAP, he continues to use and benefit from use of the PAP device and has observed improvements in snoring, but not fatigue or sleepiness. Thus, continued use of PAP was recommended and to use for the entire sleep period. On PAP, he doesn't snore, have witnessed apneas, gasp for air, or kick/thrash. Objective compliance data also suggest excellent efficacy in the home setting. At a minimum he should use PAP for at least 4 hours/night, however, \"all night, every night\" is best. In addition, PAP use during naps are also encouraged. Mr. Claude was advised to contact him DME to obtain replenishment supplies for him PAP every 3 months or as needed or for other equipment specific issues.  - Would like for him to aim to use CPAP at least 6-7h/night  - Ordered a DreamWisp mask to see if will allow him to keep on for the night rather than the nasal pillows    Daytime sleepiness/fatigue.  He has had about a 30% improvement in these symptoms with the use of the armodafinil at 250 mg.  Given the benefits and lack of side effects we will continue the medication.  He has a signed CSA from January 2024.  We will send him for a urine screening test today.      Of note is that he has tried solriamfetol in the past and did not find a benefit with it at the 75 mg dose.      Obesity.  The patient was counseled that his weight is the strongest modifiable risk " factor and contributor for BRANDEN. He was counseled to consider weight loss options to include changes in dietary habits and activity.  He is currently on Mounjaro and estimates that is lost about 35 pounds since being on the medication.  His appetite is recently been shown to improve sleep apnea.  His overall weight is only about 5 pounds less than when he was first diagnosed based on his home sleep test.  I believe he would need to get his weight down to about 210 pounds in order to see significant improvements in his sleep apnea.    Followup in 6 months    Nati Messina MD PhD

## 2024-07-17 ENCOUNTER — APPOINTMENT (OUTPATIENT)
Dept: SLEEP MEDICINE | Facility: CLINIC | Age: 39
End: 2024-07-17
Payer: COMMERCIAL

## 2024-07-17 ENCOUNTER — LAB (OUTPATIENT)
Dept: LAB | Facility: LAB | Age: 39
End: 2024-07-17
Payer: COMMERCIAL

## 2024-07-17 VITALS
BODY MASS INDEX: 35.85 KG/M2 | TEMPERATURE: 98.2 F | WEIGHT: 250.4 LBS | DIASTOLIC BLOOD PRESSURE: 79 MMHG | HEIGHT: 70 IN | HEART RATE: 95 BPM | SYSTOLIC BLOOD PRESSURE: 122 MMHG

## 2024-07-17 DIAGNOSIS — Z79.899 MEDICATION MANAGEMENT: ICD-10-CM

## 2024-07-17 DIAGNOSIS — E66.09 CLASS 1 OBESITY DUE TO EXCESS CALORIES WITH SERIOUS COMORBIDITY AND BODY MASS INDEX (BMI) OF 34.0 TO 34.9 IN ADULT: ICD-10-CM

## 2024-07-17 DIAGNOSIS — G47.10 HYPERSOMNIA: ICD-10-CM

## 2024-07-17 DIAGNOSIS — G47.33 OSA (OBSTRUCTIVE SLEEP APNEA): Primary | ICD-10-CM

## 2024-07-17 LAB
AMPHETAMINES UR QL SCN: NORMAL
BARBITURATES UR QL SCN: NORMAL
BENZODIAZ UR QL SCN: NORMAL
BZE UR QL SCN: NORMAL
CANNABINOIDS UR QL SCN: NORMAL
FENTANYL+NORFENTANYL UR QL SCN: NORMAL
METHADONE UR QL SCN: NORMAL
OPIATES UR QL SCN: NORMAL
OXYCODONE+OXYMORPHONE UR QL SCN: NORMAL
PCP UR QL SCN: NORMAL

## 2024-07-17 PROCEDURE — 3048F LDL-C <100 MG/DL: CPT | Performed by: INTERNAL MEDICINE

## 2024-07-17 PROCEDURE — 3008F BODY MASS INDEX DOCD: CPT | Performed by: INTERNAL MEDICINE

## 2024-07-17 PROCEDURE — 1036F TOBACCO NON-USER: CPT | Performed by: INTERNAL MEDICINE

## 2024-07-17 PROCEDURE — 99215 OFFICE O/P EST HI 40 MIN: CPT | Performed by: INTERNAL MEDICINE

## 2024-07-17 PROCEDURE — 3044F HG A1C LEVEL LT 7.0%: CPT | Performed by: INTERNAL MEDICINE

## 2024-07-17 PROCEDURE — 80307 DRUG TEST PRSMV CHEM ANLYZR: CPT

## 2024-07-17 PROCEDURE — 3078F DIAST BP <80 MM HG: CPT | Performed by: INTERNAL MEDICINE

## 2024-07-17 PROCEDURE — 3074F SYST BP LT 130 MM HG: CPT | Performed by: INTERNAL MEDICINE

## 2024-07-17 RX ORDER — ARMODAFINIL 250 MG/1
250 TABLET ORAL DAILY
Qty: 90 TABLET | Refills: 1 | Status: SHIPPED | OUTPATIENT
Start: 2024-07-17 | End: 2025-01-13

## 2024-07-17 NOTE — PROGRESS NOTES
10/3/2022     Reason for visit:  Status post right knee arthroscopy with partial medial meniscectomy on 8/26/2022    History of Present Illness:  Patient presents for postop evaluation. Overall he is doing well. No numbness or tingling. No fever or chills. Objective:  Ht 5' 8.5\" (1.74 m)   Wt 171 lb (77.6 kg)   BMI 25.62 kg/m²      Physical Exam:  The patient is well-appearing and in no apparent distress  Examination of the right knee   There is a small effusion, no gross deformity or skin changes  Range of motion reveals 0 to 125  Neg lachman, negative posterior drawer, no pain or laxity with varus or valgus stress at 0 degrees and 30 degrees of flexion  + medial joint line tenderness  5 out of 5 strength throughout distal muscle groups  Sensation is intact to light touch throughout all distributions  There is no calf swelling or tenderness  Palpable DP pulse, brisk cap refill, 2+ symmetric reflexes     Assessment:  Status post right knee arthroscopy with partial medial meniscectomy on 8/26/2022    Plan:  Overall the patient is doing well. He does have some medial soreness which is expected at this point. He will return to see me in 6 weeks for repeat evaluation and possible final visit. Lit Lindquist MD            Orthopaedic Surgery Sports Medicine and [address] [facility]            Team Physician [facility] ([place])      Disclaimer: This note was dictated with voice recognition software. Though review and correction are routine, we apologize for any errors. OARRS:  No data recorded  I have personally reviewed the OARRS report for Jason Claude. I have considered the risks of abuse, dependence, addiction and diversion and I believe that it is clinically appropriate for Jason Claude to be prescribed this medication    Is the patient prescribed a combination of a benzodiazepine and opioid?  No    Last Urine Drug Screen / ordered today: Will order today      Controlled Substance Agreement:  Date of the Last Agreement: 1/3/24  Reviewed Controlled Substance Agreement including but not limited to the benefits, risks, and alternatives to treatment with a Controlled Substance medication(s).    Stimulants:   What is the patient's goal of therapy? Increased alertness  Is this being achieved with current treatment? Yes    Activities of Daily Living:   Is your overall impression that this patient is benefiting (symptom reduction outweighs side effects) from stimulant therapy? Yes     1. Physical Functioning: Same  2. Family Relationship: Same  3. Social Relationship: Same  4. Mood: Same  5. Sleep Patterns: Same  6. Overall Function: Better

## 2024-07-17 NOTE — PATIENT INSTRUCTIONS
[facility]  [address]  [facility]  [address]  [facility] [address]    [address]  [facility]  [address]  [facility] [facility] [address]  [facility]  [address]  [address]  [facility] [address]           NAME: Jason Claude   DATE: 7/17/2024     Your Sleep Provider Today: Nati Messina MD PhD  Your Primary Care Physician: Reyes Reeves MD   Your Referring Provider: No ref. provider found    Thank you for coming to the Sleep Medicine Clinic today! Your sleep medicine provider today was: Nati Messina MD PhD Below is a summary of your treatment plan, other important information, and our contact numbers:  If you need to schedule an appointment, please call [phone]  If you need general assistance (e.g. forms completed, general questions), please call my , Jossie, at [phone].  If you have a medical question about your sleep issues, please contact our nurses, Sophie or Anna at [phone].   You can also contact us through [employer].      DIAGNOSIS:   1. BRANDEN (obstructive sleep apnea)  armodafinil (Nuvigil) 250 mg tablet      2. Hypersomnia  armodafinil (Nuvigil) 250 mg tablet      3. Class 1 obesity due to excess calories with serious comorbidity and body mass index (BMI) of 34.0 to 34.9 in adult        4. BMI 34.0-34.9,adult        5. Medication management  Drug Screen, Urine With Reflex to Confirmation              TREATMENT PLAN     We will plan to continue the armodafinil as you have had benefits with it.    Instructions - Common BRANDEN Recs: - For your sleep apnea, continue to use your PAP every night and use it whenever you are sleeping.   - Avoid alcohol or sedatives several hours prior to sleeping.   - Get additional supplies for your PAP (e.g., mask, hose, filters) every 3 months or as your insurance allows from your [facility] company. Replacement cushions for your PAP mask can be  requested monthly if airseals are an issue.  - Remember to clean your mask, tubings, and water chamber regularly as instructed.  - Avoid driving or operating heavy machinery when drowsy. A person driving while sleepy is five (5) times more likely to have an accident. If you feel sleepy, pull over and take a short power nap (sleep for less than 30 minutes). Otherwise, ask somebody to drive you.      Follow-up Appointment:   Followup with me in 6 months.      IMPORTANT INFORMATION     Call 911 for medical emergencies.  Our offices are generally open from Monday-Friday, 9 am - 5 pm.  If you need to get in touch with me, you may either call me and my team(number is below) or you can use Contapps.  If a referral for a test, for CPAP, or for another specialist was made, and you have not heard about scheduling this within a week, please call scheduling at [phone]).  If you are unable to make your appointment for clinic or an overnight study, kindly call the office at least 48 hours in advance to cancel and reschedule.  If you are on CPAP, please bring your device's card or the device to each clinic appointment.   There are no supporting services by either the sleep doctors or their staff on weekends and Holidays, or after 5 PM on weekdays.   If you have been asked to come to a sleep study, make sure you bring toiletries, a comfy pillow, and any nighttime medications that you may regularly take. Also be sure to eat dinner before you arrive. We generally do not provide meals.      PRESCRIPTIONS     We require 7 days advanced notice for prescription refills. If we do not receive the request in this time, we cannot guarantee that your medication will be refilled in time.      IMPORTANT PHONE NUMBERS      scheduling for medical testin513 - [phone]   Sleep Medicine Clinic Fax: [phone]  Appointments (for Pediatric Sleep Clinic): [phone] - option 1  Appointments (for Adult Sleep Clinic):  [phone] - option 2  Appointments (For Sleep Studies): [phone] - option 3  Behavioral Sleep Medicine: [phone]  Bariatric Surgery: [phone] ( Bariatric Surgery Website)   Sleep Surgery: [phone]  ENT (Otolaryngology): [phone]  Myofunctional Therapy (ENT): VANE Barragan, Austyn Shetty, Josue Jacques; [phone]   Alejandra Garcia; [phone]  Gavino Bunch; [phone]  [facility] [place]; [phone]  [facility]crecia Solis[facility] / [facility] [phone] (option 1)  Headache Clinic (Neurology): [phone]  Neurology: [phone]  Psychiatry: [phone]  Pulmonary Function Testing (PFT) Center: [phone]  Pulmonary Medicine: [phone]  [employer] (DME): [phone]  [employer] (DME): [phone]  [facility] (DME): [phone]      COMMON PROVIDERS WE REFER TO     For Weight Loss - Dr. Marycruz Winkler - Call [phone]  For Sleep Surgery - Dr. Drew Mata - Call [phone]      OUR ADULT SLEEP MEDICINE TEAM   Please do not hesitate to call the office or sleep nurse with any questions between appointments:    Adult Sleep Nurses (Anna Lundy, RN and Sophie Ramey RN):  For clinical questions and refilling prescriptions: [phone]  Email sleep diaries and other documents at: [email or web address]    Adult Sleep Medicine Secretaries:  Cristiana Jacques (For Yael/Ojeda/Krise/Strohl/Yeh/Zaragoza):   P: [phone]  F: [phone]  Jossie Mccabe (For Messina/Guggenbiller): P: [phone]  Fax: [phone]  Salome Cordova (For Jurcevic/Blank): P: [phone]  F: [phone]  Miranda Alas (For [place]): P: [phone]  F: [phone]  Patsy Hanson (For Yanira/Gregorio/Grecia): P: [phone]  F: [phone]  Kalyn Khan (For Loyd/Mac): P: [phone]  F: [phone]     Adult Sleep Medicine Advanced Practice Providers:  Chalo Swift ([place], [place])  Majo Perkins (.  "[facility] [place]  Rhona Garcia CNP ([place], [place], [place])  Gladis Cruz CNP ([place], [place], [place])  Jazzy Johnston ([place], [place], [place])  Eusebio Watts CNP ([place], [place])        OUR SLEEP TESTING LOCATIONS     Our team will contact you to schedule your sleep study, however, you can contact us as follow:  Main Phone Line (scheduling only): [phone] ([phone]), option 3  Adult and Pediatric Locations   [place] (6 years and older): [facility] - 4th floor ([address] [facility]) After hours line: [phone]  [facility] (Main campus: All ages): [facility], 6th floor. After hours line: [phone]   [place] (5 years and older; younger considered on case-by-case basis): [address]; [address], [address]. Scheduling  After hours line: [phone]   [place] (6 years and older): [address] ; [address]; [address]   [place] (6 years and older): [address]  After hours line: [phone]   [place] (13 years and older) in [place]: [address], 2nd floor  After hours line: [phone]  [facility] (13 year and older): [address]  After hours line: [phone]     Adult Only Locations:   [place] (18 years and older): [address] [facility], 2nd floor   [place] (18 years and older): [address] [facility]; 4th floor  After hours line: [phone]  [facility] (18 years and older) at [place]: [address]  After hours line: [phone]          CONTACTING YOUR SLEEP MEDICINE PROVIDER     Send a message directly to your provider through \"My Chart\", which is the email service through your  Records Account: [email or web address]   Call [phone] and leave a message. One of the administrative assistants will forward the message to your sleep medicine provider through \"My Chart\" and/or email.     Your " sleep medicine provider for this visit was: Nati Messina MD PhD

## 2024-07-18 PROBLEM — R73.09 ELEVATED RANDOM BLOOD GLUCOSE LEVEL: Status: ACTIVE | Noted: 2024-07-18

## 2024-07-18 PROBLEM — Z15.01 GENETIC SUSCEPTIBILITY TO MALIGNANT NEOPLASM OF BREAST: Status: ACTIVE | Noted: 2024-07-18

## 2024-07-18 PROBLEM — F43.22 ADJUSTMENT DISORDER WITH ANXIOUS MOOD: Status: ACTIVE | Noted: 2024-07-18

## 2024-07-18 PROBLEM — R05.8 POST-VIRAL COUGH SYNDROME: Status: ACTIVE | Noted: 2024-07-18

## 2024-07-18 PROBLEM — R09.81 CONGESTION OF PARANASAL SINUS: Status: ACTIVE | Noted: 2024-07-18

## 2024-07-18 PROBLEM — R45.4 IRRITABILITY AND ANGER: Status: ACTIVE | Noted: 2024-07-18

## 2024-07-18 PROBLEM — Q63.2 ECTOPIC KIDNEY: Status: ACTIVE | Noted: 2024-07-18

## 2024-07-18 PROBLEM — K21.9 GASTROESOPHAGEAL REFLUX DISEASE: Status: ACTIVE | Noted: 2024-07-18

## 2024-07-18 PROBLEM — F34.1 DYSTHYMIA: Status: ACTIVE | Noted: 2024-07-18

## 2024-07-18 PROBLEM — E66.9 OBESITY: Status: ACTIVE | Noted: 2024-07-18

## 2024-07-18 PROBLEM — R73.9 ELEVATED RANDOM BLOOD GLUCOSE LEVEL: Status: ACTIVE | Noted: 2024-07-18

## 2024-07-18 PROBLEM — E11.9 TYPE 2 DIABETES MELLITUS WITHOUT COMPLICATION (MULTI): Status: ACTIVE | Noted: 2024-07-18

## 2024-07-18 PROBLEM — Z86.2 HISTORY OF ANEMIA: Status: ACTIVE | Noted: 2024-07-18

## 2024-07-18 PROBLEM — G47.33 OBSTRUCTIVE SLEEP APNEA SYNDROME: Status: ACTIVE | Noted: 2024-07-18

## 2024-07-18 PROBLEM — F52.4 PREMATURE EJACULATION: Status: ACTIVE | Noted: 2024-07-18

## 2024-07-18 PROBLEM — Z86.16 HISTORY OF SEVERE ACUTE RESPIRATORY SYNDROME CORONAVIRUS 2 (SARS-COV-2) DISEASE: Status: ACTIVE | Noted: 2024-07-18

## 2024-07-18 PROBLEM — K59.00 CONSTIPATION: Status: ACTIVE | Noted: 2024-07-18

## 2024-07-18 PROBLEM — J20.9 ACUTE BRONCHITIS: Status: ACTIVE | Noted: 2024-07-18

## 2024-07-18 PROBLEM — U07.1 DISEASE DUE TO SEVERE ACUTE RESPIRATORY SYNDROME CORONAVIRUS 2 (SARS-COV-2): Status: ACTIVE | Noted: 2024-07-18

## 2024-07-24 ENCOUNTER — APPOINTMENT (OUTPATIENT)
Dept: BEHAVIORAL HEALTH | Facility: CLINIC | Age: 39
End: 2024-07-24
Payer: COMMERCIAL

## 2024-09-11 ASSESSMENT — PROMIS GLOBAL HEALTH SCALE
RATE_PHYSICAL_HEALTH: GOOD
CARRYOUT_SOCIAL_ACTIVITIES: GOOD
EMOTIONAL_PROBLEMS: SOMETIMES
RATE_QUALITY_OF_LIFE: VERY GOOD
RATE_SOCIAL_SATISFACTION: GOOD
RATE_AVERAGE_PAIN: 5
RATE_GENERAL_HEALTH: GOOD
RATE_AVERAGE_FATIGUE: SEVERE
CARRYOUT_PHYSICAL_ACTIVITIES: MODERATELY
RATE_MENTAL_HEALTH: GOOD

## 2024-09-13 DIAGNOSIS — F34.1 PERSISTENT DEPRESSIVE DISORDER WITH ANXIOUS DISTRESS, CURRENTLY SEVERE: ICD-10-CM

## 2024-09-13 RX ORDER — SERTRALINE HYDROCHLORIDE 50 MG/1
150 TABLET, FILM COATED ORAL DAILY
Qty: 270 TABLET | Refills: 1 | Status: SHIPPED | OUTPATIENT
Start: 2024-09-13 | End: 2025-03-12

## 2024-09-16 ENCOUNTER — APPOINTMENT (OUTPATIENT)
Dept: PRIMARY CARE | Facility: CLINIC | Age: 39
End: 2024-09-16
Payer: COMMERCIAL

## 2024-09-16 VITALS
WEIGHT: 244 LBS | HEIGHT: 70 IN | OXYGEN SATURATION: 97 % | SYSTOLIC BLOOD PRESSURE: 123 MMHG | BODY MASS INDEX: 34.93 KG/M2 | HEART RATE: 87 BPM | DIASTOLIC BLOOD PRESSURE: 84 MMHG

## 2024-09-16 DIAGNOSIS — Z12.11 SCREENING FOR COLORECTAL CANCER: ICD-10-CM

## 2024-09-16 DIAGNOSIS — E11.29 TYPE 2 DIABETES MELLITUS WITH MICROALBUMINURIA, WITHOUT LONG-TERM CURRENT USE OF INSULIN (MULTI): ICD-10-CM

## 2024-09-16 DIAGNOSIS — E66.09 CLASS 1 OBESITY DUE TO EXCESS CALORIES WITH SERIOUS COMORBIDITY AND BODY MASS INDEX (BMI) OF 34.0 TO 34.9 IN ADULT: ICD-10-CM

## 2024-09-16 DIAGNOSIS — E78.2 COMBINED HYPERLIPIDEMIA: ICD-10-CM

## 2024-09-16 DIAGNOSIS — Z12.12 SCREENING FOR COLORECTAL CANCER: ICD-10-CM

## 2024-09-16 DIAGNOSIS — Z00.00 ENCOUNTER FOR WELLNESS EXAMINATION: Primary | ICD-10-CM

## 2024-09-16 DIAGNOSIS — R80.9 TYPE 2 DIABETES MELLITUS WITH MICROALBUMINURIA, WITHOUT LONG-TERM CURRENT USE OF INSULIN (MULTI): ICD-10-CM

## 2024-09-16 DIAGNOSIS — Z12.83 SCREENING EXAM FOR SKIN CANCER: ICD-10-CM

## 2024-09-16 DIAGNOSIS — Z80.8 FAMILY HISTORY OF MELANOMA: ICD-10-CM

## 2024-09-16 LAB
APPEARANCE UR: CLEAR
BILIRUB UR QL STRIP: NEGATIVE
COLOR UR: YELLOW
CREAT UR STRIP-MCNC: 200 MG/DL
GLUCOSE UR STRIP-MCNC: NEGATIVE MG/DL
HGB UR QL STRIP: NEGATIVE
KETONES UR STRIP-MCNC: ABNORMAL MG/DL
LEUKOCYTE ESTERASE UR QL STRIP: ABNORMAL
MICROALBUMIN UR TEST STR-MCNC: 150 MG/L
NITRITE UR QL STRIP: NEGATIVE
PH UR STRIP: 6 [PH]
PROT UR STRIP-MCNC: ABNORMAL MG/DL
PROT/CREAT UR: ABNORMAL UG/MG CREAT
SP GR UR STRIP.AUTO: 1.02
UROBILINOGEN UR STRIP-ACNC: 0.2 E.U./DL

## 2024-09-16 PROCEDURE — 80061 LIPID PANEL: CPT

## 2024-09-16 PROCEDURE — 3048F LDL-C <100 MG/DL: CPT | Performed by: INTERNAL MEDICINE

## 2024-09-16 PROCEDURE — 85025 COMPLETE CBC W/AUTO DIFF WBC: CPT

## 2024-09-16 PROCEDURE — 90677 PCV20 VACCINE IM: CPT | Performed by: INTERNAL MEDICINE

## 2024-09-16 PROCEDURE — 80069 RENAL FUNCTION PANEL: CPT

## 2024-09-16 PROCEDURE — 81003 URINALYSIS AUTO W/O SCOPE: CPT | Performed by: INTERNAL MEDICINE

## 2024-09-16 PROCEDURE — 3044F HG A1C LEVEL LT 7.0%: CPT | Performed by: INTERNAL MEDICINE

## 2024-09-16 PROCEDURE — 3008F BODY MASS INDEX DOCD: CPT | Performed by: INTERNAL MEDICINE

## 2024-09-16 PROCEDURE — 99396 PREV VISIT EST AGE 40-64: CPT | Performed by: INTERNAL MEDICINE

## 2024-09-16 PROCEDURE — 3074F SYST BP LT 130 MM HG: CPT | Performed by: INTERNAL MEDICINE

## 2024-09-16 PROCEDURE — 99214 OFFICE O/P EST MOD 30 MIN: CPT | Performed by: INTERNAL MEDICINE

## 2024-09-16 PROCEDURE — 82043 UR ALBUMIN QUANTITATIVE: CPT | Mod: CLIA WAIVED TEST | Performed by: INTERNAL MEDICINE

## 2024-09-16 PROCEDURE — 90471 IMMUNIZATION ADMIN: CPT | Performed by: INTERNAL MEDICINE

## 2024-09-16 PROCEDURE — 3079F DIAST BP 80-89 MM HG: CPT | Performed by: INTERNAL MEDICINE

## 2024-09-16 PROCEDURE — 82570 ASSAY OF URINE CREATININE: CPT | Mod: CLIA WAIVED TEST | Performed by: INTERNAL MEDICINE

## 2024-09-16 PROCEDURE — 83036 HEMOGLOBIN GLYCOSYLATED A1C: CPT

## 2024-09-16 PROCEDURE — 1036F TOBACCO NON-USER: CPT | Performed by: INTERNAL MEDICINE

## 2024-09-16 RX ORDER — TIRZEPATIDE 12.5 MG/.5ML
12.5 INJECTION, SOLUTION SUBCUTANEOUS WEEKLY
Qty: 6 ML | Refills: 3 | Status: SHIPPED | OUTPATIENT
Start: 2024-09-16

## 2024-09-16 ASSESSMENT — PATIENT HEALTH QUESTIONNAIRE - PHQ9
1. LITTLE INTEREST OR PLEASURE IN DOING THINGS: NOT AT ALL
2. FEELING DOWN, DEPRESSED OR HOPELESS: NOT AT ALL
SUM OF ALL RESPONSES TO PHQ9 QUESTIONS 1 AND 2: 0

## 2024-09-16 NOTE — PROGRESS NOTES
"Subjective   Jason Claude is a 39 y.o. male who presents for Annual Exam (Cleve is here today for yearly CPE. Pt reports no issues or concerns to discuss at this time.).    PMH significant for long-COVID, migraines, anxiety/depression, diabetes, obesity and hypertriglyceridemia.    Interim:  - Sleep, 7/17/2024, Dr. Messina; continues on CPAP and armodafinil    Doing well overall, weight is up 5lbs from last visit.  Still seeing benefit in terms of appetite suppression but weight has plateaued.  Tries to walk, has a standing desk, active but can increase.    2 children, 9-year-old Elsa and 4 year-old Kayla. Things are going well at home, some stress with work, supervisor at [employer].    , ordained rabbi, works from home, government position, full time, history of [employer]  service with chronic pain due to back injury. Rare alcohol, no smoking.     Providers:  Sleep - Dr. Messina   - [facility], [facility]; psychiatrist is Dr. Maynard    Objective   /84 (BP Location: Left arm, Patient Position: Sitting)   Pulse 87   Ht 1.778 m (5' 10\")   Wt 111 kg (244 lb)   SpO2 97%   BMI 35.01 kg/m²      Physical Exam  Gen: NAD, pleasant, A&Ox3, central adiposity  HEENT: PERRL, EOMI, MMM, OP clear  Neck: supple, no thyromegaly, no JVD, normal carotid upstroke  Pulm: lungs CTAB, good air movement  CV: RRR, no m/r/g, 2+ DP pulses  Abd: NABS, soft, NT, ND no HSM  : no perirectal disease or abnormality  Ext: no peripheral edema  Neuro: CN II-XII intact, no focal sensory or motor deficits, normal reflexes    Assessment/Plan   Mood disorder/PE:   -Continue sertraline 150 mg  -Follow-up with mental health (looking)    Severe BRANDEN:   - on CPAP, has gotten more accustomed; sleep quality with significant improvement in events; using at least 6 hours per night, continue;   -f/u with sleep medicine  (Dr. Messina), continues on Nuvigil    T2DM:   significant improvement with A1c 6.4%  - Continue metformin " 500mg BID  - continue Mounjaro, currently on 10mg, will increase to 12.5mg  - work on improving  diet and exercise  - recommend statin and ACE/ARB, wants to avoid  - UACR - 9/16/2024    Hypertriglyceridemia: Related to insulin resistance and high serum blood glucose, continue fish oil, CTM    Migraines: without aura, continue prn treatment for now; sumatriptan helps; currently 1-2x/month        Health maintenance  -Last colonoscopy: 2 second-degree relatives colon cancer (MGM and maternal aunt), mother had polyps, suggested early screening at age 40  - PSA - no early screening indicated  -Smoking history: Never  -Counseled regarding diet and exercise  -Immunizations: annual influenza  -Followup for AWV  Problem List Items Addressed This Visit    None         Reyes Reeves MD

## 2024-09-17 LAB
ALBUMIN SERPL BCP-MCNC: 5 G/DL (ref 3.4–5)
ANION GAP SERPL CALC-SCNC: 16 MMOL/L (ref 10–20)
BASOPHILS # BLD AUTO: 0.03 X10*3/UL (ref 0–0.1)
BASOPHILS NFR BLD AUTO: 0.4 %
BUN SERPL-MCNC: 13 MG/DL (ref 6–23)
CALCIUM SERPL-MCNC: 10.2 MG/DL (ref 8.6–10.6)
CHLORIDE SERPL-SCNC: 101 MMOL/L (ref 98–107)
CHOLEST SERPL-MCNC: 197 MG/DL (ref 0–199)
CHOLESTEROL/HDL RATIO: 6.3
CO2 SERPL-SCNC: 26 MMOL/L (ref 21–32)
CREAT SERPL-MCNC: 0.87 MG/DL (ref 0.5–1.3)
EGFRCR SERPLBLD CKD-EPI 2021: >90 ML/MIN/1.73M*2
EOSINOPHIL # BLD AUTO: 0.08 X10*3/UL (ref 0–0.7)
EOSINOPHIL NFR BLD AUTO: 1.2 %
ERYTHROCYTE [DISTWIDTH] IN BLOOD BY AUTOMATED COUNT: 14.6 % (ref 11.5–14.5)
EST. AVERAGE GLUCOSE BLD GHB EST-MCNC: 120 MG/DL
GLUCOSE SERPL-MCNC: 109 MG/DL (ref 74–99)
HBA1C MFR BLD: 5.8 %
HCT VFR BLD AUTO: 40.4 % (ref 41–52)
HDLC SERPL-MCNC: 31.4 MG/DL
HGB BLD-MCNC: 13.4 G/DL (ref 13.5–17.5)
IMM GRANULOCYTES # BLD AUTO: 0.02 X10*3/UL (ref 0–0.7)
IMM GRANULOCYTES NFR BLD AUTO: 0.3 % (ref 0–0.9)
LDLC SERPL CALC-MCNC: ABNORMAL MG/DL
LYMPHOCYTES # BLD AUTO: 1.95 X10*3/UL (ref 1.2–4.8)
LYMPHOCYTES NFR BLD AUTO: 28.2 %
MCH RBC QN AUTO: 26.7 PG (ref 26–34)
MCHC RBC AUTO-ENTMCNC: 33.2 G/DL (ref 32–36)
MCV RBC AUTO: 81 FL (ref 80–100)
MONOCYTES # BLD AUTO: 0.68 X10*3/UL (ref 0.1–1)
MONOCYTES NFR BLD AUTO: 9.8 %
NEUTROPHILS # BLD AUTO: 4.16 X10*3/UL (ref 1.2–7.7)
NEUTROPHILS NFR BLD AUTO: 60.1 %
NON HDL CHOLESTEROL: 166 MG/DL (ref 0–149)
NRBC BLD-RTO: 0 /100 WBCS (ref 0–0)
PHOSPHATE SERPL-MCNC: 3 MG/DL (ref 2.5–4.9)
PLATELET # BLD AUTO: 261 X10*3/UL (ref 150–450)
POTASSIUM SERPL-SCNC: 4.4 MMOL/L (ref 3.5–5.3)
RBC # BLD AUTO: 5.01 X10*6/UL (ref 4.5–5.9)
SODIUM SERPL-SCNC: 139 MMOL/L (ref 136–145)
TRIGL SERPL-MCNC: 536 MG/DL (ref 0–149)
VLDL: ABNORMAL
WBC # BLD AUTO: 6.9 X10*3/UL (ref 4.4–11.3)

## 2025-01-02 ENCOUNTER — TELEPHONE (OUTPATIENT)
Dept: SLEEP MEDICINE | Facility: HOSPITAL | Age: 40
End: 2025-01-02
Payer: COMMERCIAL

## 2025-01-02 NOTE — TELEPHONE ENCOUNTER
[facility] for renewal of patient Prior Authorization of Armodafinil 250 mg Key: [id number].      Attempted to submit and form was unable to verify patient eligibility. Called and left a VM for patient to return Sleep Nurse call at [phone] and let us know if he has had any change to his insurance for 2025.

## 2025-01-09 ENCOUNTER — OFFICE VISIT (OUTPATIENT)
Dept: PRIMARY CARE | Facility: CLINIC | Age: 40
End: 2025-01-09
Payer: COMMERCIAL

## 2025-01-09 VITALS
SYSTOLIC BLOOD PRESSURE: 126 MMHG | BODY MASS INDEX: 34.46 KG/M2 | HEIGHT: 70 IN | DIASTOLIC BLOOD PRESSURE: 76 MMHG | TEMPERATURE: 97.7 F | HEART RATE: 95 BPM | WEIGHT: 240.7 LBS | OXYGEN SATURATION: 96 %

## 2025-01-09 DIAGNOSIS — J11.1 INFLUENZA-LIKE SYNDROME: Primary | ICD-10-CM

## 2025-01-09 PROCEDURE — 3078F DIAST BP <80 MM HG: CPT | Performed by: INTERNAL MEDICINE

## 2025-01-09 PROCEDURE — 99213 OFFICE O/P EST LOW 20 MIN: CPT | Performed by: INTERNAL MEDICINE

## 2025-01-09 PROCEDURE — 3008F BODY MASS INDEX DOCD: CPT | Performed by: INTERNAL MEDICINE

## 2025-01-09 PROCEDURE — 1036F TOBACCO NON-USER: CPT | Performed by: INTERNAL MEDICINE

## 2025-01-09 PROCEDURE — 3074F SYST BP LT 130 MM HG: CPT | Performed by: INTERNAL MEDICINE

## 2025-01-09 ASSESSMENT — ENCOUNTER SYMPTOMS
STRIDOR: 0
NECK PAIN: 0
SWOLLEN GLANDS: 0
VOMITING: 0
HOARSE VOICE: 1
TROUBLE SWALLOWING: 0
SHORTNESS OF BREATH: 0
SORE THROAT: 1
HEADACHES: 0
DIARRHEA: 1
ABDOMINAL PAIN: 1
COUGH: 1

## 2025-01-09 NOTE — PROGRESS NOTES
"Answers submitted by the patient for this visit:  Sore Throat Questionnaire (Submitted on 1/9/2025)  Chief Complaint: Sore throat  Chronicity: recurrent  Onset: in the past 7 days  Progression since onset: gradually improving  Pain worse on: neither  Fever: no fever  pain severity now: mild  Pain - numeric: 3/10  abdominal pain: Yes  congestion: Yes  cough: Yes  diarrhea: Yes  drooling: No  ear discharge: No  ear pain: No  headaches: No  hoarse voice: Yes  neck pain: No  plugged ear sensation: No  stridor: No  shortness of breath: No  swollen glands: No  trouble swallowing: No  vomiting: No  Subjective   Patient ID: Jason Claude is a 39 y.o. male who presents for Sick Visit (PT is complaining of cold symptoms and upset stomach).    Woke with congestion, sore throat and runny nose on Sunday.  No specific known sick contacts.  Cough on Mon/Tues.  Has had diarrhea Mon-Wed.  Poor appetite and cramping, was not eating a lot until today.  No fevers, chills, sweats.  No N/V.  Everything seems to be improving.  Needs documentation for travel.  Taking Vit C, Zinc, echinacea, elderberry.    Objective   Physical Exam    /76 (BP Location: Right arm, Patient Position: Sitting, BP Cuff Size: Adult)   Pulse 95   Temp 36.5 °C (97.7 °F) (Temporal)   Ht 1.778 m (5' 10\")   Wt 109 kg (240 lb 11.2 oz)   SpO2 96%   BMI 34.54 kg/m²    NAD, MMM, no adenopathy, RRR, lungs CTAB    Assessment/Plan     Flulike illness with upper respiratory and GI manifestations: Most likely norovirus, adenovirus or influenza; SARS-CoV-2 also possibility  -Appears to be improving, no concerns, self-limited illness  -Continue conservative/symptomatic management as needed  -Form signed for travel insurance as he missed a trip on 1/6/2025    Reyes Reeves MD       "

## 2025-01-22 ENCOUNTER — APPOINTMENT (OUTPATIENT)
Dept: SLEEP MEDICINE | Facility: CLINIC | Age: 40
End: 2025-01-22
Payer: COMMERCIAL

## 2025-01-29 ENCOUNTER — TELEPHONE (OUTPATIENT)
Dept: DERMATOLOGY | Facility: CLINIC | Age: 40
End: 2025-01-29
Payer: COMMERCIAL

## 2025-01-30 ENCOUNTER — OFFICE VISIT (OUTPATIENT)
Dept: URGENT CARE | Age: 40
End: 2025-01-30
Payer: COMMERCIAL

## 2025-01-30 ENCOUNTER — HOSPITAL ENCOUNTER (OUTPATIENT)
Dept: RADIOLOGY | Facility: CLINIC | Age: 40
Discharge: HOME | End: 2025-01-30
Payer: COMMERCIAL

## 2025-01-30 VITALS
SYSTOLIC BLOOD PRESSURE: 108 MMHG | TEMPERATURE: 97.8 F | OXYGEN SATURATION: 95 % | DIASTOLIC BLOOD PRESSURE: 73 MMHG | HEART RATE: 106 BPM | RESPIRATION RATE: 17 BRPM

## 2025-01-30 DIAGNOSIS — M79.652 PAIN OF LEFT THIGH: Primary | ICD-10-CM

## 2025-01-30 DIAGNOSIS — M79.652 PAIN OF LEFT THIGH: ICD-10-CM

## 2025-01-30 PROCEDURE — 73502 X-RAY EXAM HIP UNI 2-3 VIEWS: CPT | Mod: LT

## 2025-01-30 PROCEDURE — 73552 X-RAY EXAM OF FEMUR 2/>: CPT | Mod: LT

## 2025-01-30 RX ORDER — CYCLOBENZAPRINE HCL 10 MG
10 TABLET ORAL NIGHTLY
Qty: 10 TABLET | Refills: 0 | Status: SHIPPED | OUTPATIENT
Start: 2025-01-30 | End: 2025-02-09

## 2025-01-30 ASSESSMENT — PAIN SCALES - GENERAL: PAINLEVEL_OUTOF10: 6

## 2025-01-30 NOTE — PROGRESS NOTES
HPI:  Patient states that earlier today he slipped on ice and fell on his left side.  Pt denies head injury or LOC.  Pt c/o pain in his left anterior thigh.  No numbness/weakness in the leg.  Pt has a hard time putting weight on his left leg.  No bowel/bladder trouble.  No previous hx/o leg injury.  Pt had DDD.       ROS:  No ha  No dizziness  No cp  No sob  +leg pain     PE:    A&O x3  NCAT  PERRLA, EOMI  Sinus tachy  CTAB  +tndop over left lateral hip  +tndop over left anterior midthigh  +pain with left leg elevation  No focal deficit  Judgement normal    Results:  Xray left hip with pelvis and left thigh: initial read negative    A/P:   Left thigh pain  Left hip pain  We will call you with xray results if you need further treatment.  Ice.  Biofreeze.  Elevate.  Rest.  Tylenol/Motrin as needed for pain.  Follow up with orthopedist for further evaluation in 2 weeks if symptoms persist.  Keep a diary of symptoms.  Go to the ER if starts getting worse.

## 2025-03-06 DIAGNOSIS — F34.1 PERSISTENT DEPRESSIVE DISORDER WITH ANXIOUS DISTRESS, CURRENTLY SEVERE: ICD-10-CM

## 2025-03-07 RX ORDER — SERTRALINE HYDROCHLORIDE 50 MG/1
150 TABLET, FILM COATED ORAL DAILY
Qty: 270 TABLET | Refills: 1 | Status: SHIPPED | OUTPATIENT
Start: 2025-03-07 | End: 2025-09-03

## 2025-03-11 ENCOUNTER — APPOINTMENT (OUTPATIENT)
Dept: DERMATOLOGY | Facility: CLINIC | Age: 40
End: 2025-03-11
Payer: COMMERCIAL

## 2025-03-24 ENCOUNTER — APPOINTMENT (OUTPATIENT)
Dept: PRIMARY CARE | Facility: CLINIC | Age: 40
End: 2025-03-24
Payer: COMMERCIAL

## 2025-03-24 VITALS
WEIGHT: 244.5 LBS | OXYGEN SATURATION: 97 % | BODY MASS INDEX: 35 KG/M2 | TEMPERATURE: 97.5 F | SYSTOLIC BLOOD PRESSURE: 132 MMHG | DIASTOLIC BLOOD PRESSURE: 84 MMHG | HEART RATE: 89 BPM | HEIGHT: 70 IN

## 2025-03-24 DIAGNOSIS — E11.29 TYPE 2 DIABETES MELLITUS WITH MICROALBUMINURIA, WITHOUT LONG-TERM CURRENT USE OF INSULIN (MULTI): Primary | ICD-10-CM

## 2025-03-24 DIAGNOSIS — E78.2 COMBINED HYPERLIPIDEMIA: ICD-10-CM

## 2025-03-24 DIAGNOSIS — E66.811 CLASS 1 OBESITY DUE TO EXCESS CALORIES WITH SERIOUS COMORBIDITY AND BODY MASS INDEX (BMI) OF 34.0 TO 34.9 IN ADULT: ICD-10-CM

## 2025-03-24 DIAGNOSIS — E66.09 CLASS 1 OBESITY DUE TO EXCESS CALORIES WITH SERIOUS COMORBIDITY AND BODY MASS INDEX (BMI) OF 34.0 TO 34.9 IN ADULT: ICD-10-CM

## 2025-03-24 DIAGNOSIS — R80.9 TYPE 2 DIABETES MELLITUS WITH MICROALBUMINURIA, WITHOUT LONG-TERM CURRENT USE OF INSULIN (MULTI): Primary | ICD-10-CM

## 2025-03-24 PROCEDURE — 3075F SYST BP GE 130 - 139MM HG: CPT | Performed by: INTERNAL MEDICINE

## 2025-03-24 PROCEDURE — 3008F BODY MASS INDEX DOCD: CPT | Performed by: INTERNAL MEDICINE

## 2025-03-24 PROCEDURE — 99214 OFFICE O/P EST MOD 30 MIN: CPT | Performed by: INTERNAL MEDICINE

## 2025-03-24 PROCEDURE — 1036F TOBACCO NON-USER: CPT | Performed by: INTERNAL MEDICINE

## 2025-03-24 PROCEDURE — 3079F DIAST BP 80-89 MM HG: CPT | Performed by: INTERNAL MEDICINE

## 2025-03-24 NOTE — PROGRESS NOTES
"Subjective   Jason Claude is a 39 y.o. male who presents for Follow-up.    PMH significant for long-COVID, migraines, anxiety/depression, diabetes, obesity and hypertriglyceridemia.    Weight is stable.  Mounjaro increased to 12.5mg at last visit.  Minimal change, some ongoing benefit.  Tries to get some regular walks, standing desk.  Limited due to back issues, time to go to pool.  [facility].  Occasionally checks blood sugar, usually in the 110 range.      Providers:  Sleep - Dr. Messina; continues on CPAP and armodafinil   - [facility], [facility]; psychiatrist is Dr. Maynard    Objective   /84 (BP Location: Left arm, Patient Position: Sitting, BP Cuff Size: Adult)   Pulse 89   Temp 36.4 °C (97.5 °F) (Temporal)   Ht 1.778 m (5' 10\")   Wt 111 kg (244 lb 8 oz)   SpO2 97%   BMI 35.08 kg/m²      Physical Exam  Gen: NAD, pleasant, A&Ox3, central adiposity  HEENT: PERRL, EOMI, MMM, OP clear  Neck: supple, no thyromegaly, no JVD, normal carotid upstroke  Pulm: lungs CTAB, good air movement  CV: RRR, no m/r/g, 2+ DP pulses  Abd: NABS, soft, NT, ND no HSM  : no perirectal disease or abnormality  Ext: no peripheral edema  Neuro: CN II-XII intact, no focal sensory or motor deficits, normal reflexes    Assessment/Plan   Mood disorder/PE:   -Continue sertraline 150 mg  -Follow-up with mental health (looking)    Severe BRANDEN:   - on CPAP, has gotten more accustomed; sleep quality with significant improvement in events; using at least 6 hours per night, continue;   -f/u with sleep medicine  (Dr. Messina), continues on Nuvigil    T2DM:   significant improvement with A1c <6%  - Continue metformin 500mg BID  - continue Mounjaro, currently 12.5mg  - work on improving  diet and exercise  - recommend statin and ACE/ARB, wants to avoid  - UACR - 9/16/2024 (+), recheck    Hypertriglyceridemia: Related to insulin resistance and high serum blood glucose, continue fish oil, CTM    Migraines: without " aura, continue prn treatment for now; sumatriptan helps; currently 1-2x/month      Health maintenance  -Last colonoscopy: 2 second-degree relatives colon cancer (MGM and maternal aunt), mother had polyps, suggested early screening at age 40  - PSA - no early screening indicated  -Smoking history: Never  -Counseled regarding diet and exercise  -Immunizations: annual influenza  -Followup for AWV  Problem List Items Addressed This Visit    None         Reyes Reeves MD

## 2025-03-25 ENCOUNTER — APPOINTMENT (OUTPATIENT)
Dept: DERMATOLOGY | Facility: CLINIC | Age: 40
End: 2025-03-25
Payer: COMMERCIAL

## 2025-03-25 LAB
ALBUMIN SERPL-MCNC: 4.9 G/DL (ref 3.6–5.1)
ALP SERPL-CCNC: 99 U/L (ref 36–130)
ALT SERPL-CCNC: 31 U/L (ref 9–46)
ANION GAP SERPL CALCULATED.4IONS-SCNC: 11 MMOL/L (CALC) (ref 7–17)
AST SERPL-CCNC: 22 U/L (ref 10–40)
BILIRUB SERPL-MCNC: 0.4 MG/DL (ref 0.2–1.2)
BUN SERPL-MCNC: 15 MG/DL (ref 7–25)
CALCIUM SERPL-MCNC: 10.1 MG/DL (ref 8.6–10.3)
CHLORIDE SERPL-SCNC: 101 MMOL/L (ref 98–110)
CHOLEST SERPL-MCNC: 213 MG/DL
CHOLEST/HDLC SERPL: 5.3 (CALC)
CO2 SERPL-SCNC: 26 MMOL/L (ref 20–32)
CREAT SERPL-MCNC: 0.91 MG/DL (ref 0.6–1.29)
EGFRCR SERPLBLD CKD-EPI 2021: 109 ML/MIN/1.73M2
ERYTHROCYTE [DISTWIDTH] IN BLOOD BY AUTOMATED COUNT: 14.2 % (ref 11–15)
EST. AVERAGE GLUCOSE BLD GHB EST-MCNC: 148 MG/DL
EST. AVERAGE GLUCOSE BLD GHB EST-SCNC: 8.2 MMOL/L
GLUCOSE SERPL-MCNC: 146 MG/DL (ref 65–99)
HBA1C MFR BLD: 6.8 % OF TOTAL HGB
HCT VFR BLD AUTO: 41.5 % (ref 38.5–50)
HDLC SERPL-MCNC: 40 MG/DL
HGB BLD-MCNC: 13.7 G/DL (ref 13.2–17.1)
LDLC SERPL CALC-MCNC: 130 MG/DL (CALC)
MCH RBC QN AUTO: 26.6 PG (ref 27–33)
MCHC RBC AUTO-ENTMCNC: 33 G/DL (ref 32–36)
MCV RBC AUTO: 80.6 FL (ref 80–100)
NONHDLC SERPL-MCNC: 173 MG/DL (CALC)
PLATELET # BLD AUTO: 240 THOUSAND/UL (ref 140–400)
PMV BLD REES-ECKER: 10.8 FL (ref 7.5–12.5)
POTASSIUM SERPL-SCNC: 4.4 MMOL/L (ref 3.5–5.3)
PROT SERPL-MCNC: 7.8 G/DL (ref 6.1–8.1)
RBC # BLD AUTO: 5.15 MILLION/UL (ref 4.2–5.8)
SODIUM SERPL-SCNC: 138 MMOL/L (ref 135–146)
TRIGL SERPL-MCNC: 280 MG/DL
WBC # BLD AUTO: 7.2 THOUSAND/UL (ref 3.8–10.8)

## 2025-04-25 DIAGNOSIS — E11.65 TYPE 2 DIABETES MELLITUS WITH HYPERGLYCEMIA (MULTI): ICD-10-CM

## 2025-04-28 RX ORDER — METFORMIN HYDROCHLORIDE 500 MG/1
1000 TABLET, EXTENDED RELEASE ORAL 2 TIMES DAILY
Qty: 360 TABLET | Refills: 3 | Status: SHIPPED | OUTPATIENT
Start: 2025-04-28

## 2025-06-11 ENCOUNTER — TELEPHONE (OUTPATIENT)
Dept: OTHER | Facility: CLINIC | Age: 40
End: 2025-06-11
Payer: COMMERCIAL

## 2025-06-11 NOTE — TELEPHONE ENCOUNTER
Patient is being called back to the office and needs an updated letter in detail.  Patient is currently scheduled for a follow up on 8/22 but needs the letter into his employer by 6/17.  Please call patient to advise.

## 2025-06-12 ENCOUNTER — TELEPHONE (OUTPATIENT)
Dept: OTHER | Facility: CLINIC | Age: 40
End: 2025-06-12
Payer: COMMERCIAL

## 2025-06-12 SDOH — ECONOMIC STABILITY: FOOD INSECURITY: WITHIN THE PAST 12 MONTHS, THE FOOD YOU BOUGHT JUST DIDN'T LAST AND YOU DIDN'T HAVE MONEY TO GET MORE.: NEVER TRUE

## 2025-06-12 SDOH — ECONOMIC STABILITY: INCOME INSECURITY: IN THE LAST 12 MONTHS, WAS THERE A TIME WHEN YOU WERE NOT ABLE TO PAY THE MORTGAGE OR RENT ON TIME?: NO

## 2025-06-12 SDOH — ECONOMIC STABILITY: INCOME INSECURITY: HOW HARD IS IT FOR YOU TO PAY FOR THE VERY BASICS LIKE FOOD, HOUSING, MEDICAL CARE, AND HEATING?: NOT VERY HARD

## 2025-06-12 SDOH — ECONOMIC STABILITY: FOOD INSECURITY: WITHIN THE PAST 12 MONTHS, YOU WORRIED THAT YOUR FOOD WOULD RUN OUT BEFORE YOU GOT MONEY TO BUY MORE.: NEVER TRUE

## 2025-06-12 ASSESSMENT — MONTREAL COGNITIVE ASSESSMENT (MOCA)
11. FOR EACH PAIR OF WORDS, WHAT CATEGORY DO THEY BELONG TO (OUT OF 2): 2
WHAT IS THE TOTAL SCORE (OUT OF 30): 20
5. MEMORY TRIALS: 0
13. ORIENTATION SUBSCORE: 6
6. READ LIST OF DIGITS [FORWARD/BACKWARD]: 2
4. NAME EACH OF THE THREE ANIMALS SHOWN: 0
WHAT LEVEL OF EDUCATION WAS ATTAINED: 0
12. MEMORY INDEX SCORE: 3
10. [FLUENCY] NAME WORDS STARTING WITH DESIGNATED LETTER: 1
7. [VIGILENCE] TAP WHEN HEARING DESIGNATED LETTER: 1
VISUOSPATIAL/EXECUTIVE SUBSCORE: 0
9. REPEAT EACH SENTENCE: 2
8. SERIAL SUBTRACTION OF 7S: 3

## 2025-06-12 ASSESSMENT — LIFESTYLE VARIABLES
HOW OFTEN DO YOU HAVE SIX OR MORE DRINKS ON ONE OCCASION: LESS THAN MONTHLY
HOW MANY STANDARD DRINKS CONTAINING ALCOHOL DO YOU HAVE ON A TYPICAL DAY: 1 OR 2
SKIP TO QUESTIONS 9-10: 0
HOW OFTEN DO YOU HAVE A DRINK CONTAINING ALCOHOL: 2-3 TIMES A WEEK
USE_ALCOHOL_TO_HELP_SLEEP: NO
AUDIT-C TOTAL SCORE: 4
DO_YOU_DRINK?: NO DIFFERENCE

## 2025-06-12 ASSESSMENT — ANXIETY QUESTIONNAIRES
IF YOU CHECKED OFF ANY PROBLEMS ON THIS QUESTIONNAIRE, HOW DIFFICULT HAVE THESE PROBLEMS MADE IT FOR YOU TO DO YOUR WORK, TAKE CARE OF THINGS AT HOME, OR GET ALONG WITH OTHER PEOPLE: SOMEWHAT DIFFICULT
5. BEING SO RESTLESS THAT IT IS HARD TO SIT STILL: NOT AT ALL
6. BECOMING EASILY ANNOYED OR IRRITABLE: SEVERAL DAYS
4. TROUBLE RELAXING: NEARLY EVERY DAY
5. BEING SO RESTLESS THAT IT IS HARD TO SIT STILL: NOT AT ALL
2. NOT BEING ABLE TO STOP OR CONTROL WORRYING: NEARLY EVERY DAY
2. NOT BEING ABLE TO STOP OR CONTROL WORRYING: NEARLY EVERY DAY
GAD7 TOTAL SCORE: 13
6. BECOMING EASILY ANNOYED OR IRRITABLE: SEVERAL DAYS
IF YOU CHECKED OFF ANY PROBLEMS ON THIS QUESTIONNAIRE, HOW DIFFICULT HAVE THESE PROBLEMS MADE IT FOR YOU TO DO YOUR WORK, TAKE CARE OF THINGS AT HOME, OR GET ALONG WITH OTHER PEOPLE: SOMEWHAT DIFFICULT
7. FEELING AFRAID AS IF SOMETHING AWFUL MIGHT HAPPEN: MORE THAN HALF THE DAYS
3. WORRYING TOO MUCH ABOUT DIFFERENT THINGS: SEVERAL DAYS
4. TROUBLE RELAXING: NEARLY EVERY DAY
7. FEELING AFRAID AS IF SOMETHING AWFUL MIGHT HAPPEN: MORE THAN HALF THE DAYS
3. WORRYING TOO MUCH ABOUT DIFFERENT THINGS: SEVERAL DAYS
1. FEELING NERVOUS, ANXIOUS, OR ON EDGE: NEARLY EVERY DAY
1. FEELING NERVOUS, ANXIOUS, OR ON EDGE: NEARLY EVERY DAY

## 2025-06-12 ASSESSMENT — SOCIAL DETERMINANTS OF HEALTH (SDOH)

## 2025-06-12 ASSESSMENT — SLEEP AND FATIGUE QUESTIONNAIRES
FATIGUE_INTERFERES_SOCIAL_LIFE: 7 STRONGLY AGREE
EXERCISE_BRINGS_ON_FATIGUE: 3
MY MOTIVATION IS LOWER WHEN I AM FATIGUED.: 7 STRONGLY AGREE
EASILY_FATIGUED: 7 STRONGLY AGREE
EASILY_FATIGUED: 7 STRONGLY AGREE
FATIGUE_INTERFERES_RESPONSIBILITIES: 7 STRONGLY AGREE
FATIGUE_CAUSES_FREQUENT_PROBLEMTS: 7 STRONGLY AGREE
FATIGUE_INTERFERES_PHYSICAL_FUNCTIONING: 7 STRONGLY AGREE
FATIGUE_CAUSES_FREQUENT_PROBLEMTS: 7 STRONGLY AGREE
FATIGUE_INTERFERES_SOCIAL_LIFE: 7 STRONGLY AGREE
FATIGUE_MOST_DISABILING_SYMPTOM: 7 STRONGLY AGREE
MY FATIGUE PREVENTS SUSTAINED PHYSICAL FUNCTIONING.: 6
FATIGUE_MOST_DISABILING_SYMPTOM: 7 STRONGLY AGREE
FATIGUE_INTERFERES_RESPONSIBILITIES: 7 STRONGLY AGREE
MY MOTIVATION IS LOWER WHEN I AM FATIGUED.: 7 STRONGLY AGREE
VISUAL ANALOGUE FATIGUE SCALE (VAFS): 4
AVERAGE_FSS_SCORE: 6.44
MY FATIGUE PREVENTS SUSTAINED PHYSICAL FUNCTIONING.: 6
EXERCISE_BRINGS_ON_FATIGUE: 3
FATIGUE_INTERFERES_PHYSICAL_FUNCTIONING: 7 STRONGLY AGREE

## 2025-06-12 NOTE — TELEPHONE ENCOUNTER
Message left for patient to call the office to schedule an appointment.  Patient is requesting a letter for his employer but has not been seen in a year.  He must have an appointment before a letter can be provided.

## 2025-06-12 NOTE — PROGRESS NOTES
[facility] Virtual The virtual visit conducted with Audio and Video.    Verbal consent was given for the following virtual visit, patient is currently located in [place]. All issues discussed and addressed below were done so without a physical examination. If it was felt the patient needed be seen in clinic in person they were directed there.     Subjective   COVID-19 Infection Date:  12/21/2021 (tested positive still on 1/7/22) (sx: cough, fatigue, headache, runny nose, confusion, brain fog - no treatment or hospitalization)    COVID-19 vaccine status: Pfizer 2/17/21, 3/8/21, 10/25/21    Occupation: full-time supervisor at [employer]     Current Providers: PCP Dr. Reyes Reeves, Sleep Medicine Dr. Messina, Psychiatry Dr. Maynard, Neuropsychology Dr. Arreola, Psychology Beatriz Dumont    Survey scores: 08/2022 -> 01/2023 -> 07/2023 -> 01/2024 -> 06/2025  PHQ-9: 17 -> 21 -> 13 -> 18 -> 20  MATHIEU-7: 10 -> 21 -> 11 -> 14 -> 13  Sleep Wellness: 11 snores -> 14 -> 13 -> 12 -> 13  FSS average: 6.444 -> 5.889 -> 6 -> 6.33 -> 6.44  Modified ECog average: 2.25 -> 2.833 -> 1.667 -> 1.75 -> 2.67  MOCA: 19/22 blind version (08/2022) -> 20/22 (08/2023) -> 20/22 (06/2025)  Overall Health: 55 -> 66 -> 55 -> 51 -> 50 -> 60 -> 55    39 y.o. male with h/o COVID-19 in December 2021, BRCA gene mutation, obesity, GERD, DM2, BRANDEN, depression, [facility] with c/o fatigue, cognitive changes, anxiety and depression, palpitations, lightheadedness    LC symptoms are ongoing, no significant change  Fatigue is present daily, sometimes feeling not as bad, feeling more fatigued mid morning and mid afternoon  Getting lots of sleep, 7-8 hours at night, during the day another 2-3 hours  Using CPAP machine at least 5 hours per night, sometimes the whole night if sleeping deeply  Sometimes falls asleep too quickly and so wife reminds him an hour later  Sometimes using CPAP for a nap, still having times when  he is nodding off unintentionally  Thought that daytime sleepiness was improved with nuvigil, still was falling asleep, when he stopped he did not feel worse, was already maxed on dosage, sunosi didn't help at all  Brain fog is ongoing, may have improved a little bit, mostly coincides with fatigue now vs prior it happened when not fatigued  Having moments of talking during a meeting while also sleeping, that has happened less than 10 times, more than 5 times  Mood has not been great, largely tied to work and his symptoms  Tried going back to work in-office one day per week last year and the entire following day cannot function  Cannot drive due to nodding off episodes and brain fog  Has memory glitches when driving home from [place], did not recall how he got home  Symptoms were manageable with remote job he took on last year, but order to return to work in-person caused spike in anxiety and symptoms  Has lost 1/4 of his team and 3 of his supervisors which is exacerbating stress immensely   Developed a new symptom, having heart palpitations, feels like an anxiety attack on steroids  Getting light headed and flushed, feeling his heart from the outside of his chest, very scary  Palpitations are happening at least once per week, lasting varied times, anywhere from 5 minutes to half an hour  No shortness of breath, no chest pain  Feeling lightheaded and vertigo, has to sit or lay down, best thing to do is sleep it off  Hot and flushed as well  Tried seeing counselor and has not had time to find someone else   Stopped couples counseling  Before January medication that Dr. Maynard gave him was helping him feel fine, circumstances exacerbated anxiety  CPAP leslie notes 1/2 event per hour    Relevant prior healthcare visits:  -07/2023 Neuropsychology Rehab most recent session  -08/2023 Psychology session #6  -01/2024 Psychiatry for persistent depressive disorder with anxious distress related to placing his needs last leading  to work and family stress, intermittent explosive anger, addiction-like behavior leads to brief pleasure followed by worsening depression, improving, continued sertraline and recommended therapy  -07/2024 Sleep Medicine recommends increasing CPAP use, new mask ordered, 30% improvement in daytime sleepiness/fatigue on armodafinil at 250mg  -03/2025 PCP notes weight is stable on Mounjaro, [facility] looking for  provider, continues sertraline, more accustomed to CPAP, on Nuvigil per sleep medicine    Relevant prior diagnostic studies:  -08/2022 HSAT shows severe BRANDEN with O2 cole 78.1%    Relevant prior laboratory values, unremarkable unless noted:  -11/2022 Vitamin B1 245, Vitamin B6 374, Vitamin B2, SPEP, Tryptase, Respiratory Allergy Profile, CYNTHIA positive, QUINTON normal, Citrulline Ab, BNP, HBA1c 7.6%, ESR 33, RF, Lipids, Mag, Iron studies, Ferritin, CMP, CRP, CK, CBC/D, TSH, Vitamin B12 1057, Folate, Vitamin D 30, AM cortisol, Tropinin, D-Dimer, Coags  -09/2023 Vitamin B6 293, Lipids, Vitamin D, Vitamin B12 1250, Retics, iron studies, ferritin, CBC/D  -03/2025 CBC, lipids abnormal, CMP, HbA1c 6.8%    Exercise routine: 10 minutes twice per week  Diet:  Weight hx: pre-COVID-19 250 lbs -> post-COVID-19 265 lbs -> 250lbs -> 255lbs -> 255lbs -> 252lbs -> 240 lbs -> (Patient-Rptd) 238   Substance use: 1-2 servings ETOH 2-3 times per week  Social: , two girls    Current Medications[1]    Medical History[2]    Surgical History[3]    Family History[4]    Objective   There were no vitals taken for this visit.    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 Messina to discuss ongoing daytime somnolence, falling asleep while talking in a meeting, not remembering drive home, etc.  -schedule a follow up visit with Dr. Maynard  -referral to Psychology (Dr. Randall Yee, Dr. Jose Maria Kellogg, Sivakumar Carmichael, Dr. Norman Monson)  -to learn more about LDN, visit the websites [email or web address] and [email or web address]     02/2024:   fatigue, cognitive changes, anxiety and depression  -follow-up on referral to Occupational Therapist Kim Morales for cognitive rehabilitation and fatigue management, please call [phone] to set up a virtual or in-person appointment at [facility]   -continue close follow-up with sleep medicine, Psychiatry, and Psychology  -obtain Vitamin B6 repeat lab draw   -plan to extend work accommodations through July 2024 11/2023:   fatigue, cognitive changes, anxiety and depression  -stop Vitamin B6 supplementation, we will recheck your level at time of follow-up  -continue close follow-up with your specialists and their recommendations  -referral to Occupational Therapist Radhika Morales, let me know if you have any difficulty scheduling this  -extending work accommodations through 2/29 08/2023:  fatigue, cognitive changes, anxiety and depression  -recheck Vitamin B6, B12, and D levels  -continue close follow-up with your specialists and their recommendations  -continue to wear CPAP  -> stop B6    05/2023  fatigue, cognitive changes, anxiety and depression  -recheck Vitamin B6, B12, and D levels  -referral Psychiatry Dr. Maynard  -referral to [facility]  -follow-up with Neuropsychology Rehab and Sleep Medicine as scheduled  -continue to wear CPAP  Stop B6, decrease B12, D once daily    02/2023: fatigue, cognitive changes, sinus congestion, anxiety and depression  -restart nose sprays for sinus congestion. Take Flonase for 1-2 weeks and if  not having sufficient improvement add Azelastine  -repeat check Vitamins B6, B12, D   -wear CPAP during naps as well  -if you do not feel improved when back on Sertraline, I recommend evaluation by Psychiatry  -[facility], please let me know if you need a referral to our immediately available IOP option   -if no improvement in cognitive function and fatigue level in 1-2 months I recommend we refer you to Neuropsychology Rehabilitation  .brain  Stop B6, decrease B12, D 2K daily    11/2022: Fatigue, cognitive changes, sinus congestion, intermittent constipation and diarrhea, mood changes  -follow-up with blood work orders, please have this drawn in the morning and fasting  .juanito  -follow-up with therapist to address mood changes as scheduled  .fog  -try Azelastine nose spray to help with congestion  -to schedule with [facility] call [phone]  .ibs  .brain  Stop B1 and B6, decrease B12, ? D supplement dose    08/2022:  Fatigue, cognitive changes, sinus congestion, intermittent constipation, mood changes  .blood  .hsat  .fog  .con  -for intermittent constipation, try taking Miralax or a Magnesium supplement with your iron supplement. Magnesium Glycinate causes less GI side effects, Magnesium Oxide and Citrate can help with GI motility  -Flonase  .brain          Other Visit Diagnoses         Codes      Long COVID     U09.9    Relevant Orders    Vitamin D 25-Hydroxy,Total (for eval of Vitamin D levels)    Vitamin B12    Vitamin B6    TSH with reflex to Free T4 if abnormal    CBC and Auto Differential    Comprehensive Metabolic Panel    Tryptase    Histamine, Plasma    Porphobilinogen, 24 Hour Urine    QUEST DELTA AMINOLEVULINIC ACID, 24 HOUR URINE    QUEST CATECHOLAMINES, FRACT, 24 HOUR URINE W/CREAT    QUEST METANEPHRINES, FRACT. LC/MS/MS, 24 HR URINE    Holter Or Event Cardiac Monitor      Palpitations     R00.2    Relevant Orders     Vitamin D 25-Hydroxy,Total (for eval of Vitamin D levels)    Vitamin B12    Vitamin B6    TSH with reflex to Free T4 if abnormal    CBC and Auto Differential    Comprehensive Metabolic Panel    Tryptase    Histamine, Plasma    Porphobilinogen, 24 Hour Urine    QUEST DELTA AMINOLEVULINIC ACID, 24 HOUR URINE    QUEST CATECHOLAMINES, FRACT, 24 HOUR URINE W/CREAT    QUEST METANEPHRINES, FRACT. LC/MS/MS, 24 HR URINE    Holter Or Event Cardiac Monitor      Flushing     R23.2    Relevant Orders    Vitamin D 25-Hydroxy,Total (for eval of Vitamin D levels)    Vitamin B12    Vitamin B6    TSH with reflex to Free T4 if abnormal    CBC and Auto Differential    Comprehensive Metabolic Panel    Tryptase    Histamine, Plasma    Porphobilinogen, 24 Hour Urine    QUEST DELTA AMINOLEVULINIC ACID, 24 HOUR URINE    QUEST CATECHOLAMINES, FRACT, 24 HOUR URINE W/CREAT    QUEST METANEPHRINES, FRACT. LC/MS/MS, 24 HR URINE    Holter Or Event Cardiac Monitor                 [1]   Current Outpatient Medications:     ACETAMINOPHEN ORAL, Take by mouth., Disp: , Rfl:     blood sugar diagnostic (Advanced Gluc Meter Test Strip) strip, Use to test two times daily, Disp: , Rfl:     ferrous sulfate (IRON ORAL), Take by mouth., Disp: , Rfl:     Lactobacillus acidophilus (PROBIOTIC ORAL), Take by mouth., Disp: , Rfl:     MEN'S MULTI-VITAMIN ORAL, Take by mouth., Disp: , Rfl:     metFORMIN  mg 24 hr tablet, TAKE 2 TABLETS BY MOUTH TWICE A DAY, Disp: 360 tablet, Rfl: 3    naproxen sodium (Aleve) 220 mg tablet, Take 1 tablet (220 mg) by mouth every 12 hours if needed for mild pain (1 - 3)., Disp: , Rfl:     omega 3-dha-epa-fish oil (Fish OiL) 1,000 mg (120 mg-180 mg) capsule, Take 1 capsule (1,000 mg) by mouth 2 times a day., Disp: , Rfl:     rizatriptan MLT (Maxalt-MLT) 5 mg disintegrating tablet, Dissolve 1 tablet by mouth once if needed for migraine. May repeat in 2 hours if unresolved. Do not exceed 30 mg in 24 hours., Disp: 9 tablet, Rfl: 0     sertraline (Zoloft) 50 mg tablet, TAKE 3 TABLETS (150 MG) BY MOUTH ONCE DAILY., Disp: 270 tablet, Rfl: 1    tirzepatide (Mounjaro) 12.5 mg/0.5 mL pen injector, Inject 12.5 mg under the skin 1 (one) time per week., Disp: 6 mL, Rfl: 3  [2]   Past Medical History:  Diagnosis Date    Abnormal findings on diagnostic imaging of other specified body structures     Abnormal finding on chest xray    Anemia     Anesthesia of skin 05/29/2018    Left facial numbness    Anxiety     BRCA gene mutation positive in male 04/24/2023    had genetics consultation, no other screenings recommended    Chronic back pain 10/16/2013    COVID-19 long hauler manifesting chronic fatigue 04/24/2023    Depression     Eating disorder     Ectopic kidney 04/24/2023    GERD (gastroesophageal reflux disease)     Headache     Microcytic anemia 04/24/2023    Personal history of diseases of the blood and blood-forming organs and certain disorders involving the immune mechanism     History of anemia    Personal history of other (healed) physical injury and trauma 08/04/2015    History of back injury    Personal history of other diseases of the digestive system     History of gastroesophageal reflux (GERD)    Personal history of other specified conditions     History of heartburn    Plantar wart of both feet     History of onychomycosis and recurrent wart on his foot, has gone to a dermatologist received bleomycin injections as a last resort and still with recurrence. Currently under treatment for both issues with podiatrist Richie Paulino.    Premature ejaculation 04/24/2023    Scoliosis     Spina bifida occulta 04/24/2023    Type 2 diabetes mellitus with hyperglycemia, without long-term current use of insulin 04/24/2023   [3]   Past Surgical History:  Procedure Laterality Date    CIRCUMCISION, PRIMARY      REFRACTIVE SURGERY  08/04/2015    Corneal LASIK    WISDOM TOOTH EXTRACTION     [4]   Family History  Problem Relation Name Age of Onset    Diabetes  Mother Brigitte Claude     Colon polyps Mother Brigitte Claude     Obesity Mother Brigitte Claude     Hearing loss Mother Brigitte Claude     Obesity Father      Melanoma Father      Breast cancer Mother's Sister      Colon cancer Mother's Brother  60    Diabetes Mother's Brother      Colon cancer Maternal Grandmother Rojelio Jason 60    Myasthenia gravis Maternal Grandmother Rojelio Jason     Cancer Maternal Grandmother Rojelio Jason     Vision loss Maternal Grandmother Rojelio Jason     Pancreatic cancer Maternal Grandfather Ryan Jaosn     Early natural death Maternal Grandfather Ryan Jason     Anesthesia problems Paternal Grandfather Paul Claude     Deep vein thrombosis Paternal Grandfather Paul Claude     Cancer Paternal Grandfather Paul Claude     Early natural death Paternal Grandfather Paul Claude     Depression Paternal Grandmother Lillian Claude     Early natural death Paternal Grandmother Lillian Claude     Breast cancer Mother's Sister Adilia Jason     Cancer Mother's Sister Adilia Jason     Cancer Mother's Sister Adelita Jason Peter     Colon cancer Mother's Brother Matt Jason

## 2025-06-13 ENCOUNTER — TELEMEDICINE (OUTPATIENT)
Dept: OTHER | Facility: CLINIC | Age: 40
End: 2025-06-13
Payer: COMMERCIAL

## 2025-06-13 DIAGNOSIS — R23.2 FLUSHING: ICD-10-CM

## 2025-06-13 DIAGNOSIS — U09.9 POST-ACUTE SEQUELAE OF COVID-19 (PASC): Primary | ICD-10-CM

## 2025-06-13 DIAGNOSIS — U09.9 LONG COVID: ICD-10-CM

## 2025-06-13 DIAGNOSIS — R00.2 PALPITATIONS: ICD-10-CM

## 2025-06-13 NOTE — PATIENT INSTRUCTIONS
It was my pleasure seeing you in the [facility] today.  We will focus on addressing the following symptoms discussed today: fatigue, cognitive changes, anxiety and depression, palpitations, lightheadedness    My recommendations are as follows:  -comprehensive blood work, please have this drawn the day of or after episode of palpitations, you will be given a 24hr urine collection container by the lab  -holter monitor for 14 days  -schedule a follow up visit with Dr. Messina to discuss ongoing daytime somnolence, falling asleep while talking in a meeting, not remembering drive home, etc.  -schedule a follow up visit with Dr. Maynard  -referral to Psychology (Dr. Randall Yee, Dr. Jose Maria Kellogg, Sivakumar Carmichael, Dr. Norman Monson)  -to learn more about LDN, visit the websites www.[email or web address].org and www.[email or web address]     Tips to help improve brain fog and fatigue:  --avoid drinking Alcohol while recovering from Long COVID  --Focus on eating whole foods to help support your gut and immune system. Aim to eat 30 different plants per week (vegetables, fruits, beans, nuts, legumes, seeds, whole grains, herbs, spices). Avoid processed foods and beverages. Eliminate added sugars, artificial sweeteners, processed oils, artificial dyes.  --ensure to practice 30 minutes of exercise 7 days per week to keep BNDGF (brain derived neurotrophic growth factor) elevated as this will help in the regeneration of neurons, you may split exercise time up into 5 minute increments if this is better tolerated.   --slowly increase your activity by no more than 10% per week, rest when you feel tired  --utilize pacing techniques to manage fatigue, schedule rest times throughout the day so you do not run out of energy, more information to be found on this here: http://www.phsa.ca/health-info-site/Documents/post_covid-19_fatigue.pdf  --use the “attention beam” strategy to help you focus on some things while ignoring others. Imagine a  "flashlight beam illuminating the task that you are trying to focus on while leaving everything else in the dark.  --minimize external distractions to help with attention. For example, turn off the TV, radio, music, heater, and other electrical equipment, and close the window to screen out traffic noise. Complete important or difficult tasks in a quiet room if possible. Switch off mobile phones, switch off automatic email notifications. Use ear plugs if necessary or noise-cancelling headphones. Reduce visual distractions by clearing off your work-space, sit opposite to a window. Make sure lighting in the workspace is adequate.  --minimize internal distractions to help with attention. Thoughts, feelings, and physical sensations such as hunger or pain can be distracting. When you notice your attention beam is directed toward a thought or sensation, gently direct your attention back to the central focal point. You can also practice mindfulness and/or meditation to help reduce internal distractions  --games that can be tried to help improve memory and attention are Brain HQ and N-Back games  --mindfulness and meditation can be learned with the smartphone apps “Unwinding Anxiety” and “Headspace”  --Review the  Health Talk on Managing Fatigue and Thinking Changes after COVID-19 here: https://www.hospitals.org/Health-Talks/articles/2022/05/managing-fatigue-and-thinking-changes-after-covid-19  --You can also find many helpful tips and tricks in this book: \"The Long COVID self-help guide, practical ways to manage symptoms\" by [facility] [place]  --additional apps, books, and podcasts for patients suffering from Long COVID can be found at https://www.[facility] [place]/[facility]well[facility]/public-health/long-covid/long-covid-apps-books-podcasts/     We will send a message in CallsFreeCalls or call you with the results of your tests.  Further recommendations will follow based on testing results and your " symptoms.   Please return to [facility] in 3 months, call [phone] or send a message through your [employer] leslie if needed.    If you are interested in joining a clinical trial, look into the following resources:  https://clinicaltrials.gov/  https://trials.recovercovid.org/  [email or web address]/resources/clinical-trials/

## 2025-06-13 NOTE — ASSESSMENT & PLAN NOTE
06/2025:  fatigue, cognitive changes, anxiety and depression, palpitations, lightheadedness  -comprehensive blood work, please have this drawn the day of or after episode of palpitations, you will be given a 24hr urine collection container by the lab  -holter monitor for 14 days  -schedule a follow up visit with Dr. Messina to discuss ongoing daytime somnolence, falling asleep while talking in a meeting, not remembering drive home, etc.  -schedule a follow up visit with Dr. Maynard  -referral to Psychology (Dr. Randall Yee, Dr. Jose Maria Kellogg, Sivakumar Carmichael, Dr. Norman Monson)  -to learn more about LDN, visit the websites www.[email or web address].org and www.ldnresearchtrust.org     02/2024:   fatigue, cognitive changes, anxiety and depression  -follow-up on referral to Occupational Therapist Kim Morales for cognitive rehabilitation and fatigue management, please call [phone] to set up a virtual or in-person appointment at [facility]   -continue close follow-up with sleep medicine, Psychiatry, and Psychology  -obtain Vitamin B6 repeat lab draw   -plan to extend work accommodations through July 2024 11/2023:   fatigue, cognitive changes, anxiety and depression  -stop Vitamin B6 supplementation, we will recheck your level at time of follow-up  -continue close follow-up with your specialists and their recommendations  -referral to Occupational Therapist Radhika Morales, let me know if you have any difficulty scheduling this  -extending work accommodations through 2/29 08/2023:  fatigue, cognitive changes, anxiety and depression  -recheck Vitamin B6, B12, and D levels  -continue close follow-up with your specialists and their recommendations  -continue to wear CPAP  -> stop B6    05/2023  fatigue, cognitive changes, anxiety and depression  -recheck Vitamin B6, B12, and D levels  -referral Psychiatry Dr. Maynard  -referral to [facility]  -follow-up with Neuropsychology Rehab and Sleep Medicine as  scheduled  -continue to wear CPAP  Stop B6, decrease B12, D once daily    02/2023: fatigue, cognitive changes, sinus congestion, anxiety and depression  -restart nose sprays for sinus congestion. Take Flonase for 1-2 weeks and if not having sufficient improvement add Azelastine  -repeat check Vitamins B6, B12, D   -wear CPAP during naps as well  -if you do not feel improved when back on Sertraline, I recommend evaluation by Psychiatry  -[facility], please let me know if you need a referral to our immediately available IOP option   -if no improvement in cognitive function and fatigue level in 1-2 months I recommend we refer you to Neuropsychology Rehabilitation  .brain  Stop B6, decrease B12, D 2K daily    11/2022: Fatigue, cognitive changes, sinus congestion, intermittent constipation and diarrhea, mood changes  -follow-up with blood work orders, please have this drawn in the morning and fasting  .juanito  -follow-up with therapist to address mood changes as scheduled  .fog  -try Azelastine nose spray to help with congestion  -to schedule with [facility] call [phone]  .ibs  .brain  Stop B1 and B6, decrease B12, ? D supplement dose    08/2022:  Fatigue, cognitive changes, sinus congestion, intermittent constipation, mood changes  .blood  .hsat  .fog  .con  -for intermittent constipation, try taking Miralax or a Magnesium supplement with your iron supplement. Magnesium Glycinate causes less GI side effects, Magnesium Oxide and Citrate can help with GI motility  -Flonase  .brain

## 2025-06-13 NOTE — LETTER
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    Based on Jason Claude's stated history, my examination of Jason Claude, and a review of the available medical record, I find that Jason Claude is significantly impaired by symptoms of PASC. PASC is broadly defined as signs, symptoms, and conditions that continue or develop after initial COVID-19 infection. The signs, symptoms, and conditions are present 12 weeks or more after the initial phase of infection, may be multisystemic, and may present with a relapsing-remitting pattern and progression or worsening over time, which the possibility of severe and life-threatening events even months or years after infection. PASC is not one condition. It represents many potentially overlapping entities, likely with different biological causes and different sets of risk factors and outcomes. The most commonly reported persisting symptoms include: dyspnea or increased respiratory effort, fatigue, post-exertional malaise, brain fog or cognitive impairment, cough, chest pain, headache, palpitations and/or tachycardia, arthralgia, myalgia, paresthesia, abdominal pain, diarrhea, insomnia and other sleep difficulties, lightheadedness, impaired daily function and mobility, pain, rash/urticaria, mood changes, anosmia or dysgeusia, and vision/hearing changes.      Jason Claude's Fatigue Severity Score is 6.44 indicating severe levels of fatigue. His Modified Ecog average is 2.67 indicating moderate cognitive and memory complaints. Jason Claude's cognitive and memory changes are limiting his  ease and speed of thinking. Jason Claude's symptoms of fatigue, daytime somnolence, palpitations, lightheadedness, anxiety and depression are limiting his ability to engage in physical activities, iADL, ADL.    Jason Claude's condition is moderate in severity. He is  able to accommodate for his symptoms when working remotely from home, but his condition is not appropriate for an in-office position. When working remotely from home, Jason Claude is able to adjust temperature, ventilation, lighting, and sound exposure to minimize sensory input which helps preserve cognitive reserve. Jason Claude's ability to engage in in-person interactions is minimal as this increases symptoms. Noise canceling headphones or adjustable lighting in the office cannot sufficiently prevent symptom exacerbation due to the increased multi-sensory stimulation of the office setting. Working remotely from home, he is able to recline or lay down frequently to accommodate for lightheadedness, palpitations, daytime somnolence and fatigue. The severity of Jason Claude's symptoms requires him to sleep 2-3 times during the day for short periods of time, he is required to wear a CPAP during those times.    Jason Claude's condition is chronic, his symptoms have not yet improved and are not expected to improve in the next 12 months.    Please contact our clinic with any questions or concerns at [phone].    Sincerely,    Simi Guy, CNP

## 2025-07-18 LAB
25(OH)D3+25(OH)D2 SERPL-MCNC: 61 NG/ML (ref 30–100)
ALBUMIN SERPL-MCNC: 4.8 G/DL (ref 3.6–5.1)
ALP SERPL-CCNC: 81 U/L (ref 36–130)
ALT SERPL-CCNC: 43 U/L (ref 9–46)
ANION GAP SERPL CALCULATED.4IONS-SCNC: 13 MMOL/L (CALC) (ref 7–17)
AST SERPL-CCNC: 28 U/L (ref 10–40)
BASOPHILS # BLD AUTO: 39 CELLS/UL (ref 0–200)
BASOPHILS NFR BLD AUTO: 0.6 %
BILIRUB SERPL-MCNC: 0.5 MG/DL (ref 0.2–1.2)
BUN SERPL-MCNC: 15 MG/DL (ref 7–25)
CALCIUM SERPL-MCNC: 9.4 MG/DL (ref 8.6–10.3)
CHLORIDE SERPL-SCNC: 100 MMOL/L (ref 98–110)
CO2 SERPL-SCNC: 27 MMOL/L (ref 20–32)
CREAT SERPL-MCNC: 0.99 MG/DL (ref 0.6–1.29)
EGFRCR SERPLBLD CKD-EPI 2021: 98 ML/MIN/1.73M2
EOSINOPHIL # BLD AUTO: 124 CELLS/UL (ref 15–500)
EOSINOPHIL NFR BLD AUTO: 1.9 %
ERYTHROCYTE [DISTWIDTH] IN BLOOD BY AUTOMATED COUNT: 15.1 % (ref 11–15)
GLUCOSE SERPL-MCNC: 169 MG/DL (ref 65–99)
HCT VFR BLD AUTO: 41.8 % (ref 38.5–50)
HGB BLD-MCNC: 13.5 G/DL (ref 13.2–17.1)
HISTAMINE SERPL-MCNC: NORMAL NG/ML
LYMPHOCYTES # BLD AUTO: 1723 CELLS/UL (ref 850–3900)
LYMPHOCYTES NFR BLD AUTO: 26.5 %
MCH RBC QN AUTO: 26.7 PG (ref 27–33)
MCHC RBC AUTO-ENTMCNC: 32.3 G/DL (ref 32–36)
MCV RBC AUTO: 82.8 FL (ref 80–100)
MONOCYTES # BLD AUTO: 611 CELLS/UL (ref 200–950)
MONOCYTES NFR BLD AUTO: 9.4 %
NEUTROPHILS # BLD AUTO: 4004 CELLS/UL (ref 1500–7800)
NEUTROPHILS NFR BLD AUTO: 61.6 %
PLATELET # BLD AUTO: 208 THOUSAND/UL (ref 140–400)
PMV BLD REES-ECKER: 10.2 FL (ref 7.5–12.5)
POTASSIUM SERPL-SCNC: 4.3 MMOL/L (ref 3.5–5.3)
PROT SERPL-MCNC: 7.5 G/DL (ref 6.1–8.1)
PYRIDOXAL PHOS SERPL-MCNC: NORMAL UG/L
RBC # BLD AUTO: 5.05 MILLION/UL (ref 4.2–5.8)
SODIUM SERPL-SCNC: 140 MMOL/L (ref 135–146)
TRYPTASE SERPL-MCNC: NORMAL NG/ML
TSH SERPL-ACNC: 2.44 MIU/L (ref 0.4–4.5)
VIT B12 SERPL-MCNC: 1556 PG/ML (ref 200–1100)
WBC # BLD AUTO: 6.5 THOUSAND/UL (ref 3.8–10.8)

## 2025-07-24 LAB
25(OH)D3+25(OH)D2 SERPL-MCNC: 61 NG/ML (ref 30–100)
ALBUMIN SERPL-MCNC: 4.8 G/DL (ref 3.6–5.1)
ALP SERPL-CCNC: 81 U/L (ref 36–130)
ALT SERPL-CCNC: 43 U/L (ref 9–46)
ANION GAP SERPL CALCULATED.4IONS-SCNC: 13 MMOL/L (CALC) (ref 7–17)
AST SERPL-CCNC: 28 U/L (ref 10–40)
BASOPHILS # BLD AUTO: 39 CELLS/UL (ref 0–200)
BASOPHILS NFR BLD AUTO: 0.6 %
BILIRUB SERPL-MCNC: 0.5 MG/DL (ref 0.2–1.2)
BUN SERPL-MCNC: 15 MG/DL (ref 7–25)
CALCIUM SERPL-MCNC: 9.4 MG/DL (ref 8.6–10.3)
CHLORIDE SERPL-SCNC: 100 MMOL/L (ref 98–110)
CO2 SERPL-SCNC: 27 MMOL/L (ref 20–32)
CREAT SERPL-MCNC: 0.99 MG/DL (ref 0.6–1.29)
EGFRCR SERPLBLD CKD-EPI 2021: 98 ML/MIN/1.73M2
EOSINOPHIL # BLD AUTO: 124 CELLS/UL (ref 15–500)
EOSINOPHIL NFR BLD AUTO: 1.9 %
ERYTHROCYTE [DISTWIDTH] IN BLOOD BY AUTOMATED COUNT: 15.1 % (ref 11–15)
GLUCOSE SERPL-MCNC: 169 MG/DL (ref 65–99)
HCT VFR BLD AUTO: 41.8 % (ref 38.5–50)
HGB BLD-MCNC: 13.5 G/DL (ref 13.2–17.1)
HISTAMINE SERPL-MCNC: 1.5 NG/ML
LYMPHOCYTES # BLD AUTO: 1723 CELLS/UL (ref 850–3900)
LYMPHOCYTES NFR BLD AUTO: 26.5 %
MCH RBC QN AUTO: 26.7 PG (ref 27–33)
MCHC RBC AUTO-ENTMCNC: 32.3 G/DL (ref 32–36)
MCV RBC AUTO: 82.8 FL (ref 80–100)
MONOCYTES # BLD AUTO: 611 CELLS/UL (ref 200–950)
MONOCYTES NFR BLD AUTO: 9.4 %
NEUTROPHILS # BLD AUTO: 4004 CELLS/UL (ref 1500–7800)
NEUTROPHILS NFR BLD AUTO: 61.6 %
PLATELET # BLD AUTO: 208 THOUSAND/UL (ref 140–400)
PMV BLD REES-ECKER: 10.2 FL (ref 7.5–12.5)
POTASSIUM SERPL-SCNC: 4.3 MMOL/L (ref 3.5–5.3)
PROT SERPL-MCNC: 7.5 G/DL (ref 6.1–8.1)
PYRIDOXAL PHOS SERPL-MCNC: 105.5 NG/ML (ref 2.1–21.7)
RBC # BLD AUTO: 5.05 MILLION/UL (ref 4.2–5.8)
SODIUM SERPL-SCNC: 140 MMOL/L (ref 135–146)
TRYPTASE SERPL-MCNC: 3.7 MCG/L
TSH SERPL-ACNC: 2.44 MIU/L (ref 0.4–4.5)
VIT B12 SERPL-MCNC: 1556 PG/ML (ref 200–1100)
WBC # BLD AUTO: 6.5 THOUSAND/UL (ref 3.8–10.8)

## 2025-08-22 ENCOUNTER — APPOINTMENT (OUTPATIENT)
Dept: OTHER | Facility: CLINIC | Age: 40
End: 2025-08-22
Payer: COMMERCIAL

## 2025-08-27 ENCOUNTER — APPOINTMENT (OUTPATIENT)
Dept: UROLOGY | Facility: CLINIC | Age: 40
End: 2025-08-27
Payer: COMMERCIAL

## 2025-08-27 DIAGNOSIS — R79.89 LOW TESTOSTERONE: Primary | ICD-10-CM

## 2025-08-27 DIAGNOSIS — N52.9 ERECTILE DYSFUNCTION, UNSPECIFIED ERECTILE DYSFUNCTION TYPE: ICD-10-CM

## 2025-08-27 DIAGNOSIS — Z00.00 HEALTH MAINTENANCE EXAMINATION: ICD-10-CM

## 2025-08-27 DIAGNOSIS — Z12.5 PROSTATE CANCER SCREENING: ICD-10-CM

## 2025-08-27 PROCEDURE — 99204 OFFICE O/P NEW MOD 45 MIN: CPT | Performed by: UROLOGY

## 2025-08-27 RX ORDER — TADALAFIL 20 MG/1
20 TABLET ORAL AS NEEDED
Qty: 30 TABLET | Refills: 6 | Status: SHIPPED | OUTPATIENT
Start: 2025-08-27

## 2025-08-29 LAB
CREAT SERPL-MCNC: 0.95 MG/DL (ref 0.6–1.29)
EGFRCR SERPLBLD CKD-EPI 2021: 103 ML/MIN/1.73M2
FSH SERPL-ACNC: 7.3 MIU/ML (ref 1.4–12.8)
HCT VFR BLD AUTO: 42.5 % (ref 38.5–50)
LH SERPL-ACNC: 9.4 MIU/ML (ref 1.5–9.3)
PROLACTIN SERPL-MCNC: 5.5 NG/ML (ref 2–18)
PSA SERPL-MCNC: 1.48 NG/ML
TESTOST SERPL-MCNC: 195 NG/DL (ref 250–827)

## 2025-08-31 DIAGNOSIS — E66.09 CLASS 1 OBESITY DUE TO EXCESS CALORIES WITH SERIOUS COMORBIDITY AND BODY MASS INDEX (BMI) OF 34.0 TO 34.9 IN ADULT: ICD-10-CM

## 2025-08-31 DIAGNOSIS — E66.811 CLASS 1 OBESITY DUE TO EXCESS CALORIES WITH SERIOUS COMORBIDITY AND BODY MASS INDEX (BMI) OF 34.0 TO 34.9 IN ADULT: ICD-10-CM

## 2025-08-31 DIAGNOSIS — E11.29 TYPE 2 DIABETES MELLITUS WITH MICROALBUMINURIA, WITHOUT LONG-TERM CURRENT USE OF INSULIN (MULTI): ICD-10-CM

## 2025-08-31 DIAGNOSIS — R80.9 TYPE 2 DIABETES MELLITUS WITH MICROALBUMINURIA, WITHOUT LONG-TERM CURRENT USE OF INSULIN (MULTI): ICD-10-CM

## 2025-09-02 RX ORDER — TIRZEPATIDE 12.5 MG/.5ML
12.5 INJECTION, SOLUTION SUBCUTANEOUS WEEKLY
Qty: 6 ML | Refills: 3 | Status: SHIPPED | OUTPATIENT
Start: 2025-09-02

## 2025-09-05 DIAGNOSIS — F34.1 PERSISTENT DEPRESSIVE DISORDER WITH ANXIOUS DISTRESS, CURRENTLY SEVERE: ICD-10-CM

## 2025-09-05 RX ORDER — SERTRALINE HYDROCHLORIDE 50 MG/1
150 TABLET, FILM COATED ORAL DAILY
Qty: 270 TABLET | Refills: 1 | Status: SHIPPED | OUTPATIENT
Start: 2025-09-05

## 2025-09-09 ENCOUNTER — APPOINTMENT (OUTPATIENT)
Dept: PRIMARY CARE | Facility: CLINIC | Age: 40
End: 2025-09-09
Payer: COMMERCIAL

## 2025-09-16 ENCOUNTER — APPOINTMENT (OUTPATIENT)
Dept: UROLOGY | Facility: CLINIC | Age: 40
End: 2025-09-16
Payer: COMMERCIAL

## 2025-12-15 ENCOUNTER — APPOINTMENT (OUTPATIENT)
Dept: OTHER | Facility: CLINIC | Age: 40
End: 2025-12-15
Payer: COMMERCIAL

## 2026-02-18 ENCOUNTER — APPOINTMENT (OUTPATIENT)
Dept: SLEEP MEDICINE | Facility: CLINIC | Age: 41
End: 2026-02-18
Payer: COMMERCIAL